# Patient Record
Sex: MALE | Race: BLACK OR AFRICAN AMERICAN | Employment: OTHER | RURAL
[De-identification: names, ages, dates, MRNs, and addresses within clinical notes are randomized per-mention and may not be internally consistent; named-entity substitution may affect disease eponyms.]

---

## 2017-01-13 ENCOUNTER — OFFICE VISIT (OUTPATIENT)
Dept: FAMILY MEDICINE CLINIC | Age: 57
End: 2017-01-13

## 2017-01-13 VITALS
HEIGHT: 72 IN | OXYGEN SATURATION: 100 % | WEIGHT: 267 LBS | BODY MASS INDEX: 36.16 KG/M2 | HEART RATE: 76 BPM | DIASTOLIC BLOOD PRESSURE: 78 MMHG | TEMPERATURE: 98.5 F | SYSTOLIC BLOOD PRESSURE: 118 MMHG | RESPIRATION RATE: 16 BRPM

## 2017-01-13 DIAGNOSIS — H61.23 BILATERAL IMPACTED CERUMEN: ICD-10-CM

## 2017-01-13 DIAGNOSIS — B07.0 PLANTAR WART OF RIGHT FOOT: ICD-10-CM

## 2017-01-13 DIAGNOSIS — M79.671 RIGHT FOOT PAIN: ICD-10-CM

## 2017-01-13 DIAGNOSIS — I10 ESSENTIAL HYPERTENSION: Primary | ICD-10-CM

## 2017-01-13 DIAGNOSIS — E78.1 PURE HYPERGLYCERIDEMIA: ICD-10-CM

## 2017-01-13 NOTE — MR AVS SNAPSHOT
Visit Information Date & Time Provider Department Dept. Phone Encounter #  
 1/13/2017 10:00 AM Chantal Clark NP [place] [address] [facility] [phone] [id number] Your Appointments 1/13/2017 10:00 AM  
ESTABLISHED PATIENT with Chantal Clark NP  
[address] ([address]) Appt Note: SOMETHING IN FOOT?  
 [address] [address]  
[address] [facility] [address] [address] Upcoming Health Maintenance Date Due Hepatitis C Screening 1960 Pneumococcal 19-64 Medium Risk (1 of 1 - PPSV23) 6/14/1977 DTaP/Tdap/Td series (1 - Tdap) 6/14/1979 FOBT Q 1 YEAR AGE 50-75 7/14/2017 Allergies as of 1/13/2017  Review Complete On: 1/13/2017 By: Jose Jaime No Known Allergies Current Immunizations  Never Reviewed Name Date Influenza Vaccine 12/1/2016, 10/6/2014 Not reviewed this visit You Were Diagnosed With   
  
 Codes Comments Essential hypertension    -  Primary ICD-10-CM: I10 
ICD-9-CM: 401.9 Bilateral impacted cerumen     ICD-10-CM: H61.23 
ICD-9-CM: 380.4 Vitals BP Pulse Temp Resp Height(growth percentile) Weight(growth percentile) 118/78 (BP 1 Location: Right arm, BP Patient Position: Sitting) 76 98.5 °F (36.9 °C) (Oral) 16 6' (1.829 m) 267 lb (121.1 kg) SpO2 BMI Smoking Status 100% 36.21 kg/m2 Light Tobacco Smoker Vitals History BMI and BSA Data Body Mass Index Body Surface Area  
 36.21 kg/m 2 2.48 m 2 Preferred Pharmacy Pharmacy Name Phone  [facility] [phone] Your Updated Medication List  
  
   
This list is accurate as of: 1/13/17  9:42 AM.  Always use your most recent med list.  
  
  
  
  
 amitriptyline 50 mg tablet Commonly known as:  ELAVIL Take 1 Tab by mouth nightly. atorvastatin 40 mg tablet Commonly known as:  LIPITOR  
TAKE ONE TABLET BY MOUTH EVERY DAY  
  
 diclofenac EC 75 mg EC tablet Commonly known as:  VOLTAREN  
ONE A DAY  
  
 dilTIAZem  mg ER capsule Commonly known as:  CARDIZEM CD Take 1 Cap by mouth daily. Indications: HYPERTENSION  
  
 enalapril 20 mg tablet Commonly known as:  VASOTEC  
TAKE ONE TABLET BY MOUTH EVERY DAY  
  
 gabapentin 300 mg capsule Commonly known as:  NEURONTIN Take 1 Cap by mouth two (2) times a day. Indications: NEUROPATHIC PAIN  
  
 hydroCHLOROthiazide 25 mg tablet Commonly known as:  HYDRODIURIL  
TAKE ONE TABLET BY MOUTH EVERY DAY  
  
 hydrocortisone 2.5 % rectal cream  
Commonly known as:  ANUSOL-HC Insert  into rectum three (3) times daily. We Performed the Following CBC W/O DIFF [56681 CPT(R)] COLLECTION VENOUS BLOOD,VENIPUNCTURE [id number] CPT(R)] METABOLIC PANEL, BASIC [23026 CPT(R)] PSA DIAGNOSTIC (PROSTATIC SPECIFIC AG) [id number] CPT(R)] REMOVAL IMPACTED CERUMEN IRRIGATION/LVG UNILAT [id number] CPT(R)] Comments: Both ears Introducing [facility]! Mignon Franklin introduces [employer] ([employer]) patient portal. Now you can access parts of your medical record, email your doctor's office, and request medication refills online. 1. In your internet browser, go to [email or web address] 2. Click on the First Time User? Click Here link in the Sign In box. You will see the New Member Sign Up page. 3. Enter your [employer]) Access Code exactly as it appears below. You will not need to use this code after youve completed the sign-up process. If you do not sign up before the expiration date, you must request a new code. · [employer]) Access Code: [email or web address] Expires: 4/13/2017  9:42 AM 
 
4. Enter the last four digits of your Social Security Number (xxxx) and Date of Birth (mm/dd/yyyy) as indicated and click Submit. You will be taken to the next sign-up page. 5. Create a TheInfo[employer] ID. This will be your [employer] login ID and cannot be changed, so think of one that is secure and easy to remember. 6. Create a [employer] password. You can change your password at any time. 7. Enter your Password Reset Question and Answer. This can be used at a later time if you forget your password. 8. Enter your e-mail address. You will receive e-mail notification when new information is available in [address] 9. Click Sign Up. You can now view and download portions of your medical record. 10. Click the Download Summary menu link to download a portable copy of your medical information. If you have questions, please visit the Frequently Asked Questions section of the [employer] website. Remember, [employer] is NOT to be used for urgent needs. For medical emergencies, dial 911. Now available from your iPhone and Android! Please provide this summary of care documentation to your next provider. Your primary care clinician is listed as Gillian Maradiaga. If you have any questions after today's visit, please call [phone].

## 2017-01-14 LAB
BUN SERPL-MCNC: 8 MG/DL (ref 6–24)
BUN/CREAT SERPL: 7 (ref 9–20)
CALCIUM SERPL-MCNC: 9.5 MG/DL (ref 8.7–10.2)
CHLORIDE SERPL-SCNC: 98 MMOL/L (ref 96–106)
CO2 SERPL-SCNC: 24 MMOL/L (ref 18–29)
CREAT SERPL-MCNC: 1.16 MG/DL (ref 0.76–1.27)
ERYTHROCYTE [DISTWIDTH] IN BLOOD BY AUTOMATED COUNT: 13.4 % (ref 12.3–15.4)
GLUCOSE SERPL-MCNC: 97 MG/DL (ref 65–99)
HCT VFR BLD AUTO: 40.9 % (ref 37.5–51)
HGB BLD-MCNC: 12.9 G/DL (ref 12.6–17.7)
MCH RBC QN AUTO: 24.9 PG (ref 26.6–33)
MCHC RBC AUTO-ENTMCNC: 31.5 G/DL (ref 31.5–35.7)
MCV RBC AUTO: 79 FL (ref 79–97)
PLATELET # BLD AUTO: 318 X10E3/UL (ref 150–379)
POTASSIUM SERPL-SCNC: 4.4 MMOL/L (ref 3.5–5.2)
PSA SERPL-MCNC: 2.5 NG/ML (ref 0–4)
RBC # BLD AUTO: 5.19 X10E6/UL (ref 4.14–5.8)
SODIUM SERPL-SCNC: 138 MMOL/L (ref 134–144)
WBC # BLD AUTO: 7.1 X10E3/UL (ref 3.4–10.8)

## 2017-01-19 ENCOUNTER — TELEPHONE (OUTPATIENT)
Dept: FAMILY MEDICINE CLINIC | Age: 57
End: 2017-01-19

## 2017-01-19 DIAGNOSIS — I10 ESSENTIAL HYPERTENSION, MALIGNANT: Primary | ICD-10-CM

## 2017-01-19 DIAGNOSIS — Z01.00 EYE EXAM, ROUTINE: ICD-10-CM

## 2017-01-19 NOTE — TELEPHONE ENCOUNTER
Patient's wife called back seeing eye doctor in [address] does not know name but has phone # [phone]. Called office spoke with  patient will be seeing Dr Letha Platt 02/28/27 for routine eye exam and just needs referral faxed to [phone].

## 2017-01-19 NOTE — TELEPHONE ENCOUNTER
Tried to call patient / wife. There is no referral in chart for eye appointment need to know who he is seeing and when. Tried to call number given in phone message says its not working and other number are disconnected.

## 2017-03-01 ENCOUNTER — TELEPHONE (OUTPATIENT)
Dept: FAMILY MEDICINE CLINIC | Age: 57
End: 2017-03-01

## 2017-03-01 DIAGNOSIS — Z01.00 EYE EXAM, ROUTINE: Primary | ICD-10-CM

## 2017-03-01 NOTE — TELEPHONE ENCOUNTER
Patient is asking for a optometry referral to a Dr. Huy Acosta for a twitching eye. I see there is already a referral to optometry with a Dr. Adelfo Cueva. Are they in the same group or is this a different request?  Dr. Lyly Davis number is [phone]. Patient is scheduled to see him on March 23 and would like a copy of the referral sent to him to take to the visit.

## 2017-03-03 ENCOUNTER — TELEPHONE (OUTPATIENT)
Dept: FAMILY MEDICINE CLINIC | Age: 57
End: 2017-03-03

## 2017-03-06 ENCOUNTER — TELEPHONE (OUTPATIENT)
Dept: FAMILY MEDICINE CLINIC | Age: 57
End: 2017-03-06

## 2017-03-06 NOTE — TELEPHONE ENCOUNTER
Please call about a referral . Wife could not tell me what dr it is was suppose to be for or where it was at she states we should know.

## 2017-03-13 ENCOUNTER — TELEPHONE (OUTPATIENT)
Dept: FAMILY MEDICINE CLINIC | Age: 57
End: 2017-03-13

## 2017-03-30 ENCOUNTER — TELEPHONE (OUTPATIENT)
Dept: FAMILY MEDICINE CLINIC | Age: 57
End: 2017-03-30

## 2017-06-09 ENCOUNTER — TELEPHONE (OUTPATIENT)
Dept: FAMILY MEDICINE CLINIC | Age: 57
End: 2017-06-09

## 2017-06-09 NOTE — TELEPHONE ENCOUNTER
Spoke with wife. Saw specialist in [place] and is scheduled for an MRI up there but can not make that appointment and would like it to be schedule at [address]. I have advised patient's wife she will need to call office who ordered test to have them schedule appointment to ensure the correct test is ordered with proper dx. I will mail patient a list of what neurosurgeons that we have.

## 2017-06-09 NOTE — TELEPHONE ENCOUNTER
Patient is requesting an order for a brain scan to be done at [facility] for twitching of the eye.

## 2017-06-12 ENCOUNTER — TELEPHONE (OUTPATIENT)
Dept: FAMILY MEDICINE CLINIC | Age: 57
End: 2017-06-12

## 2017-06-12 ENCOUNTER — DOCUMENTATION ONLY (OUTPATIENT)
Dept: FAMILY MEDICINE CLINIC | Age: 57
End: 2017-06-12

## 2017-06-12 NOTE — TELEPHONE ENCOUNTER
Patient says the specialist he's seeing in [facility] is on the [address] and too far to go. He would like to be referred to [address] [place] [address].

## 2017-06-12 NOTE — TELEPHONE ENCOUNTER
Spoke with [facility] neurosurgeon dept. All patient information has been given to set up appointment. They will call patient / wife directly to set up appointment. Need to fax over records to [phone] as soon as we get them from Dr Sha Bhakta.

## 2017-06-12 NOTE — TELEPHONE ENCOUNTER
Spoke with patient and wife. Saw Dr Jayme Taylor neurosurgeon in [place] for twitching in right cheek and eye. Unable to make it back to that office wants to go to [facility].

## 2017-06-12 NOTE — TELEPHONE ENCOUNTER
Called to speak with patient. Left a message @ [phone] (home)  also left a message on cell number.

## 2017-06-23 ENCOUNTER — TELEPHONE (OUTPATIENT)
Dept: FAMILY MEDICINE CLINIC | Age: 57
End: 2017-06-23

## 2017-07-07 ENCOUNTER — OFFICE VISIT (OUTPATIENT)
Dept: FAMILY MEDICINE CLINIC | Age: 57
End: 2017-07-07

## 2017-07-07 VITALS
DIASTOLIC BLOOD PRESSURE: 80 MMHG | SYSTOLIC BLOOD PRESSURE: 130 MMHG | WEIGHT: 261.4 LBS | TEMPERATURE: 98.3 F | RESPIRATION RATE: 16 BRPM | OXYGEN SATURATION: 98 % | BODY MASS INDEX: 38.72 KG/M2 | HEART RATE: 99 BPM | HEIGHT: 69 IN

## 2017-07-07 DIAGNOSIS — M10.9 ACUTE GOUT OF LEFT ELBOW, UNSPECIFIED CAUSE: Primary | ICD-10-CM

## 2017-07-07 RX ORDER — GABAPENTIN 300 MG/1
300 CAPSULE ORAL 2 TIMES DAILY
COMMUNITY
End: 2017-07-10 | Stop reason: SDUPTHER

## 2017-07-07 RX ORDER — COLCHICINE 0.6 MG/1
TABLET ORAL
Qty: 12 TAB | Refills: 0 | Status: SHIPPED | OUTPATIENT
Start: 2017-07-07 | End: 2017-08-01 | Stop reason: SDUPTHER

## 2017-07-07 RX ORDER — PREDNISONE 20 MG/1
TABLET ORAL
Qty: 11 TAB | Refills: 0 | Status: SHIPPED | OUTPATIENT
Start: 2017-07-07 | End: 2017-08-01 | Stop reason: SDUPTHER

## 2017-07-07 NOTE — PROGRESS NOTES
[address] [place] [address] [facility]   [phone]     7/7/2017  Chief Complaint   Patient presents with    Elbow Swelling     amd pain ,Left       HPI  Mr. Lazarus Bollard is a 59 y.o. male presents today with acute onset of left elbow swelling. Has a h/o gout typically in his big toe. Has not had an acute attack in a long time. Reports he did eat pork chops 2 days ago and then the swelling began. Review of Systems   Constitutional: Negative for chills and fever. Cardiovascular: Negative. Gastrointestinal: Negative. Musculoskeletal: Positive for joint pain (left elbow ). Past Medical History:   Diagnosis Date    Arthritis     Diverticulosis     Gout     Hypercholesterolemia     Hypertension     Sciatica        No Known Allergies    Current Outpatient Prescriptions on File Prior to Visit   Medication Sig Dispense Refill    atorvastatin (LIPITOR) 40 mg tablet TAKE ONE TABLET BY MOUTH EVERY DAY 90 Tab 3    enalapril (VASOTEC) 20 mg tablet TAKE ONE TABLET BY MOUTH EVERY DAY 90 Tab 3    amitriptyline (ELAVIL) 50 mg tablet Take 1 Tab by mouth nightly. 90 Tab 0    hydrochlorothiazide (HYDRODIURIL) 25 mg tablet TAKE ONE TABLET BY MOUTH EVERY DAY 90 Tab 3    diltiazem CD (CARDIZEM CD) 300 mg ER capsule Take 1 Cap by mouth daily. Indications: HYPERTENSION 90 Cap 3     No current facility-administered medications on file prior to visit. Visit Vitals    /80    Pulse 99    Temp 98.3 °F (36.8 °C) (Oral)    Resp 16    Ht 5' 9\" (1.753 m)    Wt 261 lb 6.4 oz (118.6 kg)    SpO2 98%    BMI 38.6 kg/m2     Physical Exam   Musculoskeletal:        Left elbow: He exhibits decreased range of motion and swelling (no erythema). Tenderness (diffuse ) found. Vitals reviewed. 1. Acute gout of left elbow, unspecified cause  Rest; to use ICE as needed for swelling  Discussed use of medication and pending lab work.      - CBC WITH AUTOMATED DIFF  - METABOLIC PANEL, COMPREHENSIVE  - URIC ACID    - colchicine (COLCRYS) 0.6 mg tablet; Take 2 tablets intially and then 1 tablet 1 hour later. Max # 3 tablets  Indications: acute gouty arthritis  Dispense: 12 Tab; Refill: 0  - predniSONE (DELTASONE) 20 mg tablet; 3 tablets today then 2 tablets x 3 days then 1 tablet x 2 days  Dispense: 11 Tab; Refill: 0        Follow-up Disposition:  Return if symptoms worsen or fail to improve.       John Manjarrez PA-C, P

## 2017-07-08 LAB
ALBUMIN SERPL-MCNC: 4.4 G/DL (ref 3.5–5.5)
ALBUMIN/GLOB SERPL: 1.3 {RATIO} (ref 1.2–2.2)
ALP SERPL-CCNC: 76 IU/L (ref 39–117)
ALT SERPL-CCNC: 18 IU/L (ref 0–44)
AST SERPL-CCNC: 20 IU/L (ref 0–40)
BASOPHILS # BLD AUTO: 0 X10E3/UL (ref 0–0.2)
BASOPHILS NFR BLD AUTO: 0 %
BILIRUB SERPL-MCNC: 1.1 MG/DL (ref 0–1.2)
BUN SERPL-MCNC: 11 MG/DL (ref 6–24)
BUN/CREAT SERPL: 9 (ref 9–20)
CALCIUM SERPL-MCNC: 9.8 MG/DL (ref 8.7–10.2)
CHLORIDE SERPL-SCNC: 96 MMOL/L (ref 96–106)
CO2 SERPL-SCNC: 22 MMOL/L (ref 18–29)
CREAT SERPL-MCNC: 1.29 MG/DL (ref 0.76–1.27)
EOSINOPHIL # BLD AUTO: 0 X10E3/UL (ref 0–0.4)
EOSINOPHIL NFR BLD AUTO: 0 %
ERYTHROCYTE [DISTWIDTH] IN BLOOD BY AUTOMATED COUNT: 13.8 % (ref 12.3–15.4)
GLOBULIN SER CALC-MCNC: 3.4 G/DL (ref 1.5–4.5)
GLUCOSE SERPL-MCNC: 92 MG/DL (ref 65–99)
HCT VFR BLD AUTO: 39.9 % (ref 37.5–51)
HGB BLD-MCNC: 12.3 G/DL (ref 12.6–17.7)
IMM GRANULOCYTES # BLD: 0 X10E3/UL (ref 0–0.1)
IMM GRANULOCYTES NFR BLD: 0 %
LYMPHOCYTES # BLD AUTO: 2.4 X10E3/UL (ref 0.7–3.1)
LYMPHOCYTES NFR BLD AUTO: 20 %
MCH RBC QN AUTO: 24.7 PG (ref 26.6–33)
MCHC RBC AUTO-ENTMCNC: 30.8 G/DL (ref 31.5–35.7)
MCV RBC AUTO: 80 FL (ref 79–97)
MONOCYTES # BLD AUTO: 1.1 X10E3/UL (ref 0.1–0.9)
MONOCYTES NFR BLD AUTO: 10 %
NEUTROPHILS # BLD AUTO: 8.4 X10E3/UL (ref 1.4–7)
NEUTROPHILS NFR BLD AUTO: 70 %
PLATELET # BLD AUTO: 317 X10E3/UL (ref 150–379)
POTASSIUM SERPL-SCNC: 4.1 MMOL/L (ref 3.5–5.2)
PROT SERPL-MCNC: 7.8 G/DL (ref 6–8.5)
RBC # BLD AUTO: 4.97 X10E6/UL (ref 4.14–5.8)
SODIUM SERPL-SCNC: 137 MMOL/L (ref 134–144)
URATE SERPL-MCNC: 6.7 MG/DL (ref 3.7–8.6)
WBC # BLD AUTO: 11.9 X10E3/UL (ref 3.4–10.8)

## 2017-07-10 ENCOUNTER — TELEPHONE (OUTPATIENT)
Dept: FAMILY MEDICINE CLINIC | Age: 57
End: 2017-07-10

## 2017-07-10 RX ORDER — GABAPENTIN 300 MG/1
300 CAPSULE ORAL 2 TIMES DAILY
Qty: 60 CAP | Refills: 1 | Status: SHIPPED | OUTPATIENT
Start: 2017-07-10 | End: 2017-08-01 | Stop reason: SDUPTHER

## 2017-07-10 NOTE — TELEPHONE ENCOUNTER
Patient has leg spasms and is asking for medication to be sent to his pharmacy. Wife says he's had this problem in the past.  Francine Benson.

## 2017-07-10 NOTE — TELEPHONE ENCOUNTER
Spoke with patient. Here with wife who has appointment scheduled. Gout symptoms are much better. Requesting med for leg problems.

## 2017-07-14 NOTE — PROGRESS NOTES
Unable to contact patient x 2 attempts  Busy signal  WBC elevated   Uric acid level normal   F/u as needed for continued left elbow symptoms

## 2017-07-18 ENCOUNTER — TELEPHONE (OUTPATIENT)
Dept: FAMILY MEDICINE CLINIC | Age: 57
End: 2017-07-18

## 2017-07-18 DIAGNOSIS — G89.29 OTHER CHRONIC PAIN: Primary | ICD-10-CM

## 2017-07-21 ENCOUNTER — TELEPHONE (OUTPATIENT)
Dept: FAMILY MEDICINE CLINIC | Age: 57
End: 2017-07-21

## 2017-07-21 NOTE — TELEPHONE ENCOUNTER
Patient saw Kenny Zaragoza 7- for left elbow pain,called today to report pain is still a #8 on movement and requesting an x-ray at [facility]. states not urgent can wait until next week.

## 2017-07-21 NOTE — TELEPHONE ENCOUNTER
Mr Tonio Cardenas is still having problems with his elbow.   He wants an order for an elbow xray sent to [facility].  [phone]

## 2017-07-24 DIAGNOSIS — M25.521 RIGHT ELBOW PAIN: Primary | ICD-10-CM

## 2017-07-24 NOTE — TELEPHONE ENCOUNTER
Patient has been advised and orders have been faxed. I have asked patient to call us back for results if we have not called him.

## 2017-07-27 ENCOUNTER — TELEPHONE (OUTPATIENT)
Dept: FAMILY MEDICINE CLINIC | Age: 57
End: 2017-07-27

## 2017-07-27 NOTE — TELEPHONE ENCOUNTER
Spoke with wife Alexandra Hayes. Needs order for xray sent to [facility] now instead of [facility]. Order faxed to radiology and .

## 2017-08-01 DIAGNOSIS — M10.9 ACUTE GOUT OF LEFT ELBOW, UNSPECIFIED CAUSE: ICD-10-CM

## 2017-08-01 RX ORDER — GABAPENTIN 300 MG/1
300 CAPSULE ORAL 2 TIMES DAILY
Qty: 180 CAP | Refills: 1 | Status: SHIPPED | OUTPATIENT
Start: 2017-08-01 | End: 2018-03-19 | Stop reason: SDUPTHER

## 2017-08-01 RX ORDER — PREDNISONE 20 MG/1
TABLET ORAL
Qty: 11 TAB | Refills: 0 | Status: SHIPPED | OUTPATIENT
Start: 2017-08-01 | End: 2018-01-22 | Stop reason: ALTCHOICE

## 2017-08-01 RX ORDER — COLCHICINE 0.6 MG/1
TABLET ORAL
Qty: 12 TAB | Refills: 0 | Status: SHIPPED | OUTPATIENT
Start: 2017-08-01 | End: 2018-03-19 | Stop reason: SDUPTHER

## 2017-08-09 ENCOUNTER — TELEPHONE (OUTPATIENT)
Dept: FAMILY MEDICINE CLINIC | Age: 57
End: 2017-08-09

## 2017-08-09 DIAGNOSIS — M79.673 HEEL PAIN, UNSPECIFIED LATERALITY: ICD-10-CM

## 2017-08-09 DIAGNOSIS — M77.8 TENDINITIS OF ELBOW: Primary | ICD-10-CM

## 2017-08-09 NOTE — TELEPHONE ENCOUNTER
Patient would also like his referral to include a problem with Rt heel pain and a \"pins and needle\" feeling.

## 2017-08-15 ENCOUNTER — TELEPHONE (OUTPATIENT)
Dept: FAMILY MEDICINE CLINIC | Age: 57
End: 2017-08-15

## 2017-08-16 NOTE — TELEPHONE ENCOUNTER
Patient's wife Callie Quinteros has been advised of appointment with Dr Joaquin Dolan 09/18/17 @ 1045 am.

## 2017-09-15 RX ORDER — AMITRIPTYLINE HYDROCHLORIDE 50 MG/1
50 TABLET, FILM COATED ORAL
Qty: 90 TAB | Refills: 0 | Status: SHIPPED | OUTPATIENT
Start: 2017-09-15 | End: 2017-12-08 | Stop reason: SDUPTHER

## 2017-11-09 RX ORDER — DILTIAZEM HYDROCHLORIDE 300 MG/1
300 CAPSULE, COATED, EXTENDED RELEASE ORAL DAILY
Qty: 90 CAP | Refills: 1 | Status: SHIPPED | OUTPATIENT
Start: 2017-11-09 | End: 2018-02-16 | Stop reason: SDUPTHER

## 2017-12-20 RX ORDER — AMITRIPTYLINE HYDROCHLORIDE 50 MG/1
TABLET, FILM COATED ORAL
Qty: 90 TAB | Refills: 0 | Status: SHIPPED | OUTPATIENT
Start: 2017-12-20 | End: 2018-03-11 | Stop reason: SDUPTHER

## 2018-01-22 ENCOUNTER — OFFICE VISIT (OUTPATIENT)
Dept: FAMILY MEDICINE CLINIC | Age: 58
End: 2018-01-22

## 2018-01-22 VITALS
SYSTOLIC BLOOD PRESSURE: 132 MMHG | WEIGHT: 272.8 LBS | HEIGHT: 69 IN | HEART RATE: 70 BPM | DIASTOLIC BLOOD PRESSURE: 78 MMHG | TEMPERATURE: 97.8 F | OXYGEN SATURATION: 99 % | BODY MASS INDEX: 40.4 KG/M2 | RESPIRATION RATE: 18 BRPM

## 2018-01-22 DIAGNOSIS — I10 ESSENTIAL HYPERTENSION: Primary | ICD-10-CM

## 2018-01-22 DIAGNOSIS — M19.90 ARTHRITIS: ICD-10-CM

## 2018-01-22 DIAGNOSIS — Z23 ENCOUNTER FOR IMMUNIZATION: ICD-10-CM

## 2018-01-22 DIAGNOSIS — Z00.00 ENCOUNTER FOR MEDICARE ANNUAL WELLNESS EXAM: ICD-10-CM

## 2018-01-22 DIAGNOSIS — Z11.59 ENCOUNTER FOR HEPATITIS C SCREENING TEST FOR LOW RISK PATIENT: ICD-10-CM

## 2018-01-22 DIAGNOSIS — Z12.5 SCREENING FOR PROSTATE CANCER: ICD-10-CM

## 2018-01-22 DIAGNOSIS — H61.23 CERUMEN DEBRIS ON TYMPANIC MEMBRANE OF BOTH EARS: ICD-10-CM

## 2018-01-22 DIAGNOSIS — R35.1 NOCTURIA: ICD-10-CM

## 2018-01-22 DIAGNOSIS — Z12.11 COLON CANCER SCREENING: ICD-10-CM

## 2018-01-22 DIAGNOSIS — E66.01 OBESITY, MORBID (HCC): ICD-10-CM

## 2018-01-22 DIAGNOSIS — E78.1 PURE HYPERGLYCERIDEMIA: ICD-10-CM

## 2018-01-22 NOTE — PROGRESS NOTES
Subjective:     Ana Martin is a 59 y.o. male who presents today with the following:  Chief Complaint   Patient presents with    Annual Wellness Visit     subsequent   Erika Banks presents for chronic disease management for medical condition listed below. Ate breakfast this morning would like to come in on Friday for fasting labs. COMPLIANT WITH MEDICATION:   HTN; Denies chest pain, dyspnea, palpitations, headache and blurred vision. Blood pressure normotensive. BMI:Discussed the patient's BMI with him. The BMI follow up plan is as follows:     dietary management education, guidance, and counseling  encourage exercise  monitor weight  prescribed dietary intake    An After Visit Summary was printed and given to the patient. Arthritis: good relief from tylenol affecting primarily back ad knees (especially right knee). HM: Will check prostate today. Nocturia 2 to 3 times per night. Requesting prostate exam.  Check FIT test today. Pneumonia vaccine. ROS:  Gen: denies fever, chills, fatigue, weight loss, weight gain  HEENT:denies blurry vision, nasal congestion, sore throat  Resp: denies dypsnea, cough, wheezing  CV: denies chest pain radiating to the jaws or arms, palpitations, lower extremity edema  Abd: denies nausea, vomiting, diarrhea, constipation  Neuro: denies numbness/tingling  Endo: denies polyuria, polydipsia, heat/cold intolerance  Heme: no lymphadenopathy    No Known Allergies      Current Outpatient Prescriptions:     amitriptyline (ELAVIL) 50 mg tablet, TAKE 1 TABLET NIGHTLY., Disp: 90 Tab, Rfl: 0    dilTIAZem CD (CARDIZEM CD) 300 mg ER capsule, Take 1 Cap by mouth daily. Indications: hypertension, Disp: 90 Cap, Rfl: 1    colchicine (COLCRYS) 0.6 mg tablet, Take 2 tablets intially and then 1 tablet 1 hour later. Max # 3 tablets  Indications: acute gouty arthritis, Disp: 12 Tab, Rfl: 0    gabapentin (NEURONTIN) 300 mg capsule, Take 1 Cap by mouth two (2) times a day. , Disp: 180 Cap, Rfl: 1    atorvastatin (LIPITOR) 40 mg tablet, TAKE ONE TABLET BY MOUTH EVERY DAY, Disp: 90 Tab, Rfl: 3    enalapril (VASOTEC) 20 mg tablet, TAKE ONE TABLET BY MOUTH EVERY DAY, Disp: 90 Tab, Rfl: 3    hydrochlorothiazide (HYDRODIURIL) 25 mg tablet, TAKE ONE TABLET BY MOUTH EVERY DAY, Disp: 90 Tab, Rfl: 3    Past Medical History:   Diagnosis Date    Arthritis     Diverticulosis     Gout     Hypercholesterolemia     Hypertension     Sciatica        Past Surgical History:   Procedure Laterality Date    HX COLONOSCOPY  022011    HX COLONOSCOPY  8/2015    polyp x1       History   Smoking Status    Light Tobacco Smoker    Types: Cigarettes   Smokeless Tobacco    Never Used     Comment: occassionally       Social History     Social History    Marital status:      Spouse name: N/A    Number of children: N/A    Years of education: N/A     Social History Main Topics    Smoking status: Light Tobacco Smoker     Types: Cigarettes    Smokeless tobacco: Never Used      Comment: occassionally    Alcohol use Yes    Drug use: No    Sexual activity: Not Asked     Other Topics Concern     Service No    Blood Transfusions No    Caffeine Concern No    Occupational Exposure No    Hobby Hazards No    Sleep Concern No    Stress Concern No    Weight Concern Yes    Special Diet No    Back Care Yes     back Lower Lumbar problems    Exercise Yes    Bike Helmet No    Seat Belt Yes    Self-Exams Yes     Social History Narrative       Family History   Problem Relation Age of Onset    No Known Problems Mother          Objective:     Visit Vitals    /78 (BP 1 Location: Left arm, BP Patient Position: Sitting)    Pulse 70    Temp 97.8 °F (36.6 °C) (Temporal)    Resp 18    Ht 5' 9\" (1.753 m)    Wt 272 lb 12.8 oz (123.7 kg)    SpO2 99%    BMI 40.29 kg/m2     Body mass index is 40.29 kg/(m^2). General: Alert and oriented. No acute distress.   Well nourished, obesity  HEENT :  Ears: cerumen impaction post irrigation without instrumentation TMs are normal. Canals are clear. Eyes: pupils equal, round, react to light and accommodation. Extra ocular movements intact. Nose: patent. Mouth and throat is clear. Neck:supple full range of motion no thyromegaly. Trachea midline, No carotid bruits. No significant lymphadenopathy  Lungs[de-identified] clear to auscultation without wheezes, rales, or rhonchi. Heart :RRR, S1 & S2 are normal intensity. No murmur; no gallop  Abdomen: bowel sounds active. No tenderness, guarding, rebound, masses, hepatic or spleen enlargement  : no nodules mild enlargement. Back: no CVA tenderness. Extremities: without clubbing, cyanosis, or edema  Pulses: radial and femoral pulses are normal  Neuro: HMF intact. Cranial nerves II through XII grossly normal.  Motor: is 5 over 5 and symmetrical.   Deep tendon reflexes: +2 equal          No results found for this visit on 01/22/18. Assessment/ Plan:     Diagnoses and all orders for this visit:    1. Essential hypertension  -     CBC WITH AUTOMATED DIFF; Future  -     METABOLIC PANEL, COMPREHENSIVE; Future  -     LIPID PANEL; Future    2. BMI 40.0-44.9, adult (HCC)  -     CBC WITH AUTOMATED DIFF; Future    3. Encounter for immunization  -     PNEUMOCOCCAL CONJ VACCINE 13 VALENT IM  -     AR IMMUNIZ ADMIN,1 SINGLE/COMB VAC/TOXOID    4. Cerumen debris on tympanic membrane of both ears  -     REMOVAL IMPACTED CERUMEN IRRIGATION/LVG UNILAT    5. Obesity, morbid ([address]  -     CBC WITH AUTOMATED DIFF; Future  -     METABOLIC PANEL, COMPREHENSIVE; Future  -     LIPID PANEL; Future    6. Arthritis    7. Pure hyperglyceridemia    8. Screening for prostate cancer  -     PROSTATE SPECIFIC AG; Future    9. Encounter for hepatitis C screening test for low risk patient  -     HEPATITIS C AB; Future    10. Nocturia   -     PROSTATE SPECIFIC AG; Future    11. Colon cancer screening  -     OCCULT BLOOD, IMMUNOASSAY (FIT)         1. Essential hypertension    2. BMI 40.0-44.9, adult ([facility]    3. Encounter for immunization    4. Cerumen debris on tympanic membrane of both ears    5. Obesity, morbid ([address]    6. Arthritis    7. Pure hyperglyceridemia    8. Screening for prostate cancer    9. Encounter for hepatitis C screening test for low risk patient    10. Nocturia     11. Colon cancer screening        Orders Placed This Encounter    REMOVAL IMPACTED CERUMEN IRRIGATION/LVG UNILAT    PNEUMOCOCCAL CONJ VACCINE 13 VALENT IM    CBC WITH AUTOMATED DIFF     Standing Status:   Future     Standing Expiration Date:   8/86/1502    METABOLIC PANEL, COMPREHENSIVE     Standing Status:   Future     Standing Expiration Date:   7/22/2018    LIPID PANEL     Standing Status:   Future     Standing Expiration Date:   7/22/2018    PROSTATE SPECIFIC AG     Standing Status:   Future     Standing Expiration Date:   7/22/2018    HEPATITIS C AB     Standing Status:   Future     Standing Expiration Date:   7/22/2018    OCCULT BLOOD, IMMUNOASSAY (FIT)    FL IMMUNIZ ADMIN,1 SINGLE/COMB VAC/TOXOID         Verbal and written instructions (see AVS) provided.  Patient expresses understanding of diagnosis and treatment plan. Follow-up Disposition:  Return in about 3 months (around 4/22/2018). Abraham Echeverria, WILLY-HEIDE        Stephanie Garcia is a 59 y.o. male and presents for annual Medicare Wellness Visit. Problem List: Reviewed with patient and discussed risk factors.     Patient Active Problem List   Diagnosis Code    Hypertension I10    Arthritis M19.90    Esophagitis determined by endoscopy K20.9    Enteritis due to Escherichia coli A04.4    Hyperlipemia E78.5    Diverticulosis large intestine w/o perforation or abscess w/o bleeding K57.30    Obesity, morbid (HCC) E66.01       Current medical providers:  Patient Care Team:  Chelsea Avila NP as PCP - General (Nurse Practitioner)  Jessika Miller MD (General Surgery)    [address] [place] [address] Po: Reviewed with patient  Past Surgical History:   Procedure Laterality Date   Ree Ran COLONOSCOPY  [id number]   Ree Ran COLONOSCOPY  8/2015    polyp x1        SH: Reviewed with patient  Social History   Substance Use Topics    Smoking status: Light Tobacco Smoker     Types: Cigarettes    Smokeless tobacco: Never Used      Comment: occassionally    Alcohol use Yes       FH: Reviewed with patient  Family History   Problem Relation Age of Onset    No Known Problems Mother        Medications/Allergies: Reviewed with patient  Current Outpatient Prescriptions on File Prior to Visit   Medication Sig Dispense Refill    amitriptyline (ELAVIL) 50 mg tablet TAKE 1 TABLET NIGHTLY. 90 Tab 0    dilTIAZem CD (CARDIZEM CD) 300 mg ER capsule Take 1 Cap by mouth daily. Indications: hypertension 90 Cap 1    colchicine (COLCRYS) 0.6 mg tablet Take 2 tablets intially and then 1 tablet 1 hour later. Max # 3 tablets  Indications: acute gouty arthritis 12 Tab 0    gabapentin (NEURONTIN) 300 mg capsule Take 1 Cap by mouth two (2) times a day. 180 Cap 1    atorvastatin (LIPITOR) 40 mg tablet TAKE ONE TABLET BY MOUTH EVERY DAY 90 Tab 3    enalapril (VASOTEC) 20 mg tablet TAKE ONE TABLET BY MOUTH EVERY DAY 90 Tab 3    hydrochlorothiazide (HYDRODIURIL) 25 mg tablet TAKE ONE TABLET BY MOUTH EVERY DAY 90 Tab 3     No current facility-administered medications on file prior to visit. No Known Allergies    Objective:  Visit Vitals    /78 (BP 1 Location: Left arm, BP Patient Position: Sitting)    Pulse 70    Temp 97.8 °F (36.6 °C) (Temporal)    Resp 18    Ht 5' 9\" (1.753 m)    Wt 272 lb 12.8 oz (123.7 kg)    SpO2 99%    BMI 40.29 kg/m2    Body mass index is 40.29 kg/(m^2).     Assessment of cognitive impairment: Alert and oriented x 3    Depression Screen:   PHQ over the last two weeks 1/22/2018   Little interest or pleasure in doing things Not at all   Feeling down, depressed or hopeless Not at all   Total Score PHQ 2 0       Fall Risk Assessment:  No flowsheet data found. Functional Ability:   Does the patient exhibit a steady gait? yes   How long did it take the patient to get up and walk from a sitting position? 3 seconds   Is the patient self reliant?  (ie can do own laundry, meals, household chores)  yes     Does the patient handle his/her own medications? yes     Does the patient handle his/her own money? yes     Is the patients home safe (ie good lighting, handrails on stairs and bath, etc.)? yes     Did you notice or did patient express any hearing difficulties? no     Did you notice or did patient express any vision difficulties?   no     Were distance and reading eye charts used? no       Advance Care Planning:   Patient was offered the opportunity to discuss advance care planning:  yes     Does patient have an Advance Directive:  no   If no, did you provide information on Caring Connections? yes       Plan:      Orders Placed This Encounter    REMOVAL IMPACTED CERUMEN IRRIGATION/LVG UNILAT    PNEUMOCOCCAL CONJ VACCINE 13 VALENT IM    CBC WITH AUTOMATED DIFF    METABOLIC PANEL, COMPREHENSIVE    LIPID PANEL    PROSTATE SPECIFIC AG    HEPATITIS C AB    OCCULT BLOOD, IMMUNOASSAY (FIT)    MD IMMUNIZ ADMIN,1 SINGLE/COMB VAC/TOXOID       Health Maintenance   Topic Date Due    Hepatitis C Screening  1960    FOBT Q 1 YEAR AGE 50-75  07/14/2017    MEDICARE YEARLY EXAM  01/23/2019    DTaP/Tdap/Td series (2 - Td) 01/22/2028    Pneumococcal 19-64 Medium Risk  Completed    Influenza Age 5 to Adult  Completed       *Patient verbalized understanding and agreement with the plan. A copy of the After Visit Summary with personalized health plan was given to the patient today. Follow-up Disposition:  Return in about 3 months (around 4/22/2018).         CARLTON Perez

## 2018-01-22 NOTE — ACP (ADVANCE CARE PLANNING)
Advance care planning discussed with patient form given and instructed to bring back to file in chart.

## 2018-01-22 NOTE — MR AVS SNAPSHOT
[address] [place] [address] [address] 
[phone] Patient: Gaby Good 
MRN: [id number] DPH:7/77/4616 Visit Information Date & Time Provider Department Dept. Phone Encounter #  
 1/22/2018  8:00 AM Eneida Fajardo NP [facility] 1340 [facility] [phone] [id number] Upcoming Health Maintenance Date Due Hepatitis C Screening 1960 FOBT Q 1 YEAR AGE 50-75 7/14/2017 MEDICARE YEARLY EXAM 1/23/2019 DTaP/Tdap/Td series (2 - Td) 1/22/2028 Allergies as of 1/22/2018  Review Complete On: 1/22/2018 By: Eneida Fajardo NP No Known Allergies Current Immunizations  Never Reviewed Name Date Influenza Vaccine 10/12/2017, 12/1/2016, 10/6/2014 Pneumococcal Conjugate (PCV-13) 1/22/2018 Not reviewed this visit You Were Diagnosed With   
  
 Codes Comments Essential hypertension    -  Primary ICD-10-CM: I10 
ICD-9-CM: 401.9 BMI 40.0-44.9, adult [facility])     ICD-10-CM: Z68.41 
ICD-9-CM: V85.41 Encounter for immunization     ICD-10-CM: A28 ICD-9-CM: V03.89 Cerumen debris on tympanic membrane of both ears     ICD-10-CM: H61.23 
ICD-9-CM: 380.4 Obesity, morbid ([address]     ICD-10-CM: E66.01 
ICD-9-CM: 278.01 Arthritis     ICD-10-CM: M19.90 ICD-9-CM: 716.90 Pure hyperglyceridemia     ICD-10-CM: E78.1 ICD-9-CM: 272.1 Screening for prostate cancer     ICD-10-CM: Z12.5 ICD-9-CM: V76.44 Encounter for hepatitis C screening test for low risk patient     ICD-10-CM: Z11.59 
ICD-9-CM: V73.89 Nocturia     ICD-10-CM: R35.1 ICD-9-CM: 788.43 Colon cancer screening     ICD-10-CM: Z12.11 ICD-9-CM: V76.51 Vitals BP Pulse Temp Resp Height(growth percentile) 132/78 (BP 1 Location: Left arm, BP Patient Position: Sitting) 70 97.8 °F (36.6 °C) (Temporal) 18 5' 9\" (1.753 m) Weight(growth percentile) SpO2 BMI Smoking Status 272 lb 12.8 oz (123.7 kg) 99% 40.29 kg/m2 Light Tobacco Smoker BMI and BSA Data Body Mass Index Body Surface Area  
 40.29 kg/m 2 2.45 m 2 Preferred Pharmacy Pharmacy Name Phone  [address] [phone] Your Updated Medication List  
  
   
This list is accurate as of: 1/22/18  9:57 AM.  Always use your most recent med list.  
  
  
  
  
 amitriptyline 50 mg tablet Commonly known as:  ELAVIL TAKE 1 TABLET NIGHTLY. atorvastatin 40 mg tablet Commonly known as:  LIPITOR  
TAKE ONE TABLET BY MOUTH EVERY DAY  
  
 colchicine 0.6 mg tablet Commonly known as:  COLCRYS Take 2 tablets intially and then 1 tablet 1 hour later. Max # 3 tablets  Indications: acute gouty arthritis  
  
 dilTIAZem  mg ER capsule Commonly known as:  CARDIZEM CD Take 1 Cap by mouth daily. Indications: hypertension  
  
 enalapril 20 mg tablet Commonly known as:  VASOTEC  
TAKE ONE TABLET BY MOUTH EVERY DAY  
  
 gabapentin 300 mg capsule Commonly known as:  NEURONTIN Take 1 Cap by mouth two (2) times a day. hydroCHLOROthiazide 25 mg tablet Commonly known as:  HYDRODIURIL  
TAKE ONE TABLET BY MOUTH EVERY DAY We Performed the Following OCCULT BLOOD, IMMUNOASSAY (FIT) [id number] CPT(R)] PNEUMOCOCCAL CONJ VACCINE 13 VALENT IM [id number] CPT(R)] NE IMMUNIZ ADMIN,1 SINGLE/COMB VAC/TOXOID Q0239294 CPT(R)] REMOVAL IMPACTED CERUMEN IRRIGATION/LVG UNILAT [id number] CPT(R)] To-Do List   
 01/22/2018 Lab:  CBC WITH AUTOMATED DIFF   
  
 01/22/2018 Lab:  HEPATITIS C AB   
  
 01/22/2018 Lab:  LIPID PANEL   
  
 01/22/2018 Lab:  METABOLIC PANEL, COMPREHENSIVE   
  
 01/22/2018 Lab:  PSA, DIAGNOSTIC (PROSTATE SPECIFIC AG) Introducing [facility] & HEALTH SERVICES!    
 [employer] introduces [employer] patient portal. Now you can access parts of your medical record, email your doctor's office, and request medication refills online. 1. In your internet browser, go to [email or web address] 2. Click on the First Time User? Click Here link in the Sign In box. You will see the New Member Sign Up page. 3. Enter your MenuSpring Access Code exactly as it appears below. You will not need to use this code after youve completed the sign-up process. If you do not sign up before the expiration date, you must request a new code. · MenuSpring Access Code: [email or web address] Expires: 2/19/2018  7:20 AM 
 
4. Enter the last four digits of your Social Security Number (xxxx) and Date of Birth (mm/dd/yyyy) as indicated and click Submit. You will be taken to the next sign-up page. 5. Create a MenuSpring ID. This will be your MenuSpring login ID and cannot be changed, so think of one that is secure and easy to remember. 6. Create a MenuSpring password. You can change your password at any time. 7. Enter your Password Reset Question and Answer. This can be used at a later time if you forget your password. 8. Enter your e-mail address. You will receive e-mail notification when new information is available in [address] 9. Click Sign Up. You can now view and download portions of your medical record. 10. Click the Download Summary menu link to download a portable copy of your medical information. If you have questions, please visit the Frequently Asked Questions section of the MenuSpring website. Remember, MenuSpring is NOT to be used for urgent needs. For medical emergencies, dial 911. Now available from your iPhone and Android! Please provide this summary of care documentation to your next provider. Your primary care clinician is listed as Ciarra Gamino. If you have any questions after today's visit, please call [phone].

## 2018-01-24 LAB — HEMOCCULT STL QL IA: NEGATIVE

## 2018-01-31 ENCOUNTER — CLINICAL SUPPORT (OUTPATIENT)
Dept: FAMILY MEDICINE CLINIC | Age: 58
End: 2018-01-31

## 2018-01-31 DIAGNOSIS — R35.1 NOCTURIA: ICD-10-CM

## 2018-01-31 DIAGNOSIS — E66.01 OBESITY, MORBID (HCC): ICD-10-CM

## 2018-01-31 DIAGNOSIS — Z11.59 ENCOUNTER FOR HEPATITIS C SCREENING TEST FOR LOW RISK PATIENT: ICD-10-CM

## 2018-01-31 DIAGNOSIS — I10 ESSENTIAL HYPERTENSION: ICD-10-CM

## 2018-01-31 DIAGNOSIS — Z12.5 SCREENING FOR PROSTATE CANCER: ICD-10-CM

## 2018-01-31 NOTE — MR AVS SNAPSHOT
[address] [facility] 
 
 
 [address] 
[phone] Patient: Lorraine Arevalo 
MRN: [id number] IFO:[birth year] Visit Information Date & Time Provider Department Dept. Phone Encounter #  
 1/31/2018  3:00 PM [address] [phone] [id number] Your Appointments 1/31/2018  3:00 PM  
Nurse Visit with [address] ([facility]) Appt Note: Labs; Labs for SALMA Carr; Labs for SALMA Nayely Carr [address] [facility] [address]  
[address] [facility] 9449 [facility] [address] Upcoming Health Maintenance Date Due Hepatitis C Screening 1960 FOBT Q 1 YEAR AGE 50-75 1/22/2019 MEDICARE YEARLY EXAM 1/23/2019 DTaP/Tdap/Td series (2 - Td) 1/22/2028 Allergies as of 1/31/2018  Review Complete On: 1/22/2018 By: Marely Cabrera NP No Known Allergies Current Immunizations  Never Reviewed Name Date Influenza Vaccine 10/12/2017, 12/1/2016, 10/6/2014 Pneumococcal Conjugate (PCV-13) 1/22/2018 Not reviewed this visit You Were Diagnosed With   
  
 Codes Comments BMI 40.0-44.9, adult [facility])     ICD-10-CM: Z68.41 
ICD-9-CM: V85.41 Essential hypertension     ICD-10-CM: I10 
ICD-9-CM: 401.9 Obesity, morbid ([facility] [address]     ICD-10-CM: E66.01 
ICD-9-CM: 278.01 Nocturia     ICD-10-CM: R35.1 ICD-9-CM: 788.43 Screening for prostate cancer     ICD-10-CM: Z12.5 ICD-9-CM: V76.44 Encounter for hepatitis C screening test for low risk patient     ICD-10-CM: Z11.59 
ICD-9-CM: V73.89 Vitals Smoking Status Light Tobacco Smoker Preferred Pharmacy Pharmacy Name Phone  [address] [phone] Your Updated Medication List  
  
   
 This list is accurate as of: 1/31/18  9:37 AM.  Always use your most recent med list.  
  
  
  
  
 amitriptyline 50 mg tablet Commonly known as:  ELAVIL TAKE 1 TABLET NIGHTLY. atorvastatin 40 mg tablet Commonly known as:  LIPITOR  
TAKE ONE TABLET BY MOUTH EVERY DAY  
  
 colchicine 0.6 mg tablet Commonly known as:  COLCRYS Take 2 tablets intially and then 1 tablet 1 hour later. Max # 3 tablets  Indications: acute gouty arthritis  
  
 dilTIAZem  mg ER capsule Commonly known as:  CARDIZEM CD Take 1 Cap by mouth daily. Indications: hypertension  
  
 enalapril 20 mg tablet Commonly known as:  VASOTEC  
TAKE ONE TABLET BY MOUTH EVERY DAY  
  
 gabapentin 300 mg capsule Commonly known as:  NEURONTIN Take 1 Cap by mouth two (2) times a day. hydroCHLOROthiazide 25 mg tablet Commonly known as:  HYDRODIURIL  
TAKE ONE TABLET BY MOUTH EVERY DAY We Performed the Following CBC WITH AUTOMATED DIFF [14196 CPT(R)] COLLECTION VENOUS BLOOD,VENIPUNCTURE [id number] CPT(R)] HEPATITIS C AB [24241 CPT(R)] LIPID PANEL [45833 CPT(R)] METABOLIC PANEL, COMPREHENSIVE [75463 CPT(R)] PSA, DIAGNOSTIC (PROSTATE SPECIFIC AG) [id number] CPT(R)] Introducing [facility] & HEALTH SERVICES! Maynor Brito introduces [employer] patient portal. Now you can access parts of your medical record, email your doctor's office, and request medication refills online. 1. In your internet browser, go to [email or web address] 2. Click on the First Time User? Click Here link in the Sign In box. You will see the New Member Sign Up page. 3. Enter your CloudAcademy Access Code exactly as it appears below. You will not need to use this code after youve completed the sign-up process. If you do not sign up before the expiration date, you must request a new code. · CloudAcademy Access Code: [email or web address] Expires: 2/19/2018  7:20 AM 
 
 4. Enter the last four digits of your Social Security Number (xxxx) and Date of Birth (mm/dd/yyyy) as indicated and click Submit. You will be taken to the next sign-up page. 5. Create a [employer] ID. This will be your [employer] login ID and cannot be changed, so think of one that is secure and easy to remember. 6. Create a [employer] password. You can change your password at any time. 7. Enter your Password Reset Question and Answer. This can be used at a later time if you forget your password. 8. Enter your e-mail address. You will receive e-mail notification when new information is available in [address] 9. Click Sign Up. You can now view and download portions of your medical record. 10. Click the Download Summary menu link to download a portable copy of your medical information. If you have questions, please visit the Frequently Asked Questions section of the [employer] website. Remember, [employer] is NOT to be used for urgent needs. For medical emergencies, dial 911. Now available from your iPhone and Android! Please provide this summary of care documentation to your next provider. Your primary care clinician is listed as Melanie Primrose. If you have any questions after today's visit, please call [phone].

## 2018-02-01 LAB
ALBUMIN SERPL-MCNC: 4.5 G/DL (ref 3.5–5.5)
ALBUMIN/GLOB SERPL: 1.4 {RATIO} (ref 1.2–2.2)
ALP SERPL-CCNC: 84 IU/L (ref 39–117)
ALT SERPL-CCNC: 22 IU/L (ref 0–44)
AST SERPL-CCNC: 23 IU/L (ref 0–40)
BASOPHILS # BLD AUTO: 0 X10E3/UL (ref 0–0.2)
BASOPHILS NFR BLD AUTO: 0 %
BILIRUB SERPL-MCNC: 0.7 MG/DL (ref 0–1.2)
BUN SERPL-MCNC: 9 MG/DL (ref 6–24)
BUN/CREAT SERPL: 7 (ref 9–20)
CALCIUM SERPL-MCNC: 9.7 MG/DL (ref 8.7–10.2)
CHLORIDE SERPL-SCNC: 99 MMOL/L (ref 96–106)
CHOLEST SERPL-MCNC: 140 MG/DL (ref 100–199)
CO2 SERPL-SCNC: 27 MMOL/L (ref 18–29)
CREAT SERPL-MCNC: 1.21 MG/DL (ref 0.76–1.27)
EOSINOPHIL # BLD AUTO: 0.1 X10E3/UL (ref 0–0.4)
EOSINOPHIL NFR BLD AUTO: 1 %
ERYTHROCYTE [DISTWIDTH] IN BLOOD BY AUTOMATED COUNT: 13.4 % (ref 12.3–15.4)
GFR SERPLBLD CREATININE-BSD FMLA CKD-EPI: 65 ML/MIN/1.73
GFR SERPLBLD CREATININE-BSD FMLA CKD-EPI: 75 ML/MIN/1.73
GLOBULIN SER CALC-MCNC: 3.3 G/DL (ref 1.5–4.5)
GLUCOSE SERPL-MCNC: 108 MG/DL (ref 65–99)
HCT VFR BLD AUTO: 41 % (ref 37.5–51)
HCV AB S/CO SERPL IA: <0.1 S/CO RATIO (ref 0–0.9)
HDLC SERPL-MCNC: 45 MG/DL
HGB BLD-MCNC: 12.9 G/DL (ref 13–17.7)
IMM GRANULOCYTES # BLD: 0 X10E3/UL (ref 0–0.1)
IMM GRANULOCYTES NFR BLD: 0 %
LDLC SERPL CALC-MCNC: 80 MG/DL (ref 0–99)
LYMPHOCYTES # BLD AUTO: 3 X10E3/UL (ref 0.7–3.1)
LYMPHOCYTES NFR BLD AUTO: 35 %
MCH RBC QN AUTO: 25 PG (ref 26.6–33)
MCHC RBC AUTO-ENTMCNC: 31.5 G/DL (ref 31.5–35.7)
MCV RBC AUTO: 80 FL (ref 79–97)
MONOCYTES # BLD AUTO: 0.7 X10E3/UL (ref 0.1–0.9)
MONOCYTES NFR BLD AUTO: 8 %
NEUTROPHILS # BLD AUTO: 4.6 X10E3/UL (ref 1.4–7)
NEUTROPHILS NFR BLD AUTO: 56 %
PLATELET # BLD AUTO: 289 X10E3/UL (ref 150–379)
POTASSIUM SERPL-SCNC: 4.8 MMOL/L (ref 3.5–5.2)
PROT SERPL-MCNC: 7.8 G/DL (ref 6–8.5)
PSA SERPL-MCNC: 2.5 NG/ML (ref 0–4)
RBC # BLD AUTO: 5.16 X10E6/UL (ref 4.14–5.8)
SODIUM SERPL-SCNC: 139 MMOL/L (ref 134–144)
TRIGL SERPL-MCNC: 77 MG/DL (ref 0–149)
VLDLC SERPL CALC-MCNC: 15 MG/DL (ref 5–40)
WBC # BLD AUTO: 8.5 X10E3/UL (ref 3.4–10.8)

## 2018-02-06 ENCOUNTER — TELEPHONE (OUTPATIENT)
Dept: FAMILY MEDICINE CLINIC | Age: 58
End: 2018-02-06

## 2018-02-16 RX ORDER — ATORVASTATIN CALCIUM 40 MG/1
TABLET, FILM COATED ORAL
Qty: 90 TAB | Refills: 3 | Status: SHIPPED | OUTPATIENT
Start: 2018-02-16 | End: 2018-10-24 | Stop reason: SDUPTHER

## 2018-02-16 RX ORDER — DILTIAZEM HYDROCHLORIDE 300 MG/1
300 CAPSULE, COATED, EXTENDED RELEASE ORAL DAILY
Qty: 90 CAP | Refills: 1 | Status: SHIPPED | OUTPATIENT
Start: 2018-02-16 | End: 2018-03-19 | Stop reason: SDUPTHER

## 2018-03-12 RX ORDER — AMITRIPTYLINE HYDROCHLORIDE 50 MG/1
TABLET, FILM COATED ORAL
Qty: 90 TAB | Refills: 0 | Status: SHIPPED | OUTPATIENT
Start: 2018-03-12 | End: 2018-05-28 | Stop reason: SDUPTHER

## 2018-03-19 DIAGNOSIS — M10.9 ACUTE GOUT OF LEFT ELBOW, UNSPECIFIED CAUSE: ICD-10-CM

## 2018-03-19 NOTE — TELEPHONE ENCOUNTER
[facility] [address] [facility] told patient to call us for refills of Colchicine, Diltiazem, Enalapril, Gabapentin and HCTZ

## 2018-03-20 RX ORDER — DILTIAZEM HYDROCHLORIDE 300 MG/1
300 CAPSULE, COATED, EXTENDED RELEASE ORAL DAILY
Qty: 90 CAP | Refills: 3 | Status: SHIPPED | OUTPATIENT
Start: 2018-03-20 | End: 2018-11-06 | Stop reason: SDUPTHER

## 2018-03-20 RX ORDER — ENALAPRIL MALEATE 20 MG/1
TABLET ORAL
Qty: 90 TAB | Refills: 3 | Status: SHIPPED | OUTPATIENT
Start: 2018-03-20 | End: 2018-11-06 | Stop reason: SDUPTHER

## 2018-03-20 RX ORDER — HYDROCHLOROTHIAZIDE 25 MG/1
TABLET ORAL
Qty: 90 TAB | Refills: 3 | Status: SHIPPED | OUTPATIENT
Start: 2018-03-20 | End: 2018-11-06 | Stop reason: SDUPTHER

## 2018-03-20 RX ORDER — COLCHICINE 0.6 MG/1
TABLET ORAL
Qty: 90 TAB | Refills: 3 | Status: SHIPPED | OUTPATIENT
Start: 2018-03-20 | End: 2018-11-06 | Stop reason: SDUPTHER

## 2018-03-20 RX ORDER — GABAPENTIN 300 MG/1
300 CAPSULE ORAL 2 TIMES DAILY
Qty: 180 CAP | Refills: 3 | Status: SHIPPED | OUTPATIENT
Start: 2018-03-20 | End: 2018-11-06 | Stop reason: SDUPTHER

## 2018-03-29 ENCOUNTER — TELEPHONE (OUTPATIENT)
Dept: FAMILY MEDICINE CLINIC | Age: 58
End: 2018-03-29

## 2018-05-10 ENCOUNTER — TELEPHONE (OUTPATIENT)
Dept: FAMILY MEDICINE CLINIC | Age: 58
End: 2018-05-10

## 2018-05-10 NOTE — TELEPHONE ENCOUNTER
Pt wanted to know if he could come in on Monday and see someone. He can't raise his right arm. I mentioned to pt we are booked out and may have him call a different office, but I was going to check and see if we could work anything out.  Call [phone]

## 2018-05-22 ENCOUNTER — TELEPHONE (OUTPATIENT)
Dept: FAMILY MEDICINE CLINIC | Age: 58
End: 2018-05-22

## 2018-05-22 NOTE — TELEPHONE ENCOUNTER
Pt is has pain in the R arm and also his R knee gives out on him. He wanted to know if he could be seen on Monday June 4th?

## 2018-05-28 RX ORDER — AMITRIPTYLINE HYDROCHLORIDE 50 MG/1
TABLET, FILM COATED ORAL
Qty: 90 TAB | Refills: 0 | Status: SHIPPED | OUTPATIENT
Start: 2018-05-28 | End: 2018-08-30 | Stop reason: ALTCHOICE

## 2018-07-09 ENCOUNTER — TELEPHONE (OUTPATIENT)
Dept: FAMILY MEDICINE CLINIC | Age: 58
End: 2018-07-09

## 2018-07-09 NOTE — TELEPHONE ENCOUNTER
Patient's Flu shot has already been documented and the correct date was verified with Martinez Castillo / French Terry.

## 2018-07-24 ENCOUNTER — TELEPHONE (OUTPATIENT)
Dept: FAMILY MEDICINE CLINIC | Age: 58
End: 2018-07-24

## 2018-07-24 NOTE — TELEPHONE ENCOUNTER
PC to inform Mr. Agustin Berry that our office did receive the notes from Dr. Keya Ghotra office and will be placed on Yamel's desk for review. I will check also with Dolly Morgan to see whether or not he should see her for his arm pain that is still bothering him or go back to Dr. Velma Jimenez office? A VM was left to return the call.

## 2018-07-24 NOTE — TELEPHONE ENCOUNTER
SW Mrs. Nadira Terry, she was informed that [facility] agreed that Mr. Nadira Terry needs to give Dr. Fiordaliza Frye office a call since he is still having the problem with his arm. She stated OK because it is still bothering him even though he has had the shot.

## 2018-07-24 NOTE — TELEPHONE ENCOUNTER
SW Mrs. Nathan Villa, she was informed that I did not see any notes as of yet from Dr. Jeison Harman office, but when we get them, they will be reviewed by Xochitl Rogers and scanned into his chart. She stated OK and that he just wanted to make sure that Xochitl Rogers would have them for when he makes an appointment to see her so she will know what's going on with his visit with Dr. Tere Landin.

## 2018-08-08 ENCOUNTER — TELEPHONE (OUTPATIENT)
Dept: FAMILY MEDICINE CLINIC | Age: 58
End: 2018-08-08

## 2018-08-08 NOTE — TELEPHONE ENCOUNTER
Spoke with patient's wife Jonatan Han. Asking about results of xray that was done @ Dr Keya Ghotra office. Advised wife I am unable to give results from another provider she will need to contact Dr Keya Ghotra office for that information. She agreed to do so.

## 2018-10-02 NOTE — TELEPHONE ENCOUNTER
Called to speak with patient / wife. Left a message on cell voicemail. GEN - NAD; well appearing; A+O x3, anxious, crying  HEAD - NC/AT     EYES - EOMI, no conjunctival pallor, no scleral icterus  ENT -   mucous membranes  moist , no discharge      NECK - Neck supple  PULM - CTA b/l,  symmetric breath sounds  COR -  RRR, S1 S2, no murmurs  ABD - , ND, NT, soft, gravid abdomen, no guarding, no rebound, no masses    BACK - no CVA tenderness, nontender spine     EXTREMS - no edema, no deformity, warm and well perfused    SKIN - no rash or bruising      NEUROLOGIC - alert, sensation nl, motor 5/5 RUE/LUE/RLE/LLE

## 2018-10-24 PROBLEM — M47.816 SPONDYLOSIS OF LUMBAR REGION WITHOUT MYELOPATHY OR RADICULOPATHY: Status: ACTIVE | Noted: 2017-08-22

## 2018-11-06 PROBLEM — R35.1 NOCTURIA: Status: ACTIVE | Noted: 2018-11-06

## 2019-11-12 PROBLEM — I10 HTN (HYPERTENSION): Status: ACTIVE | Noted: 2019-11-12

## 2019-11-12 PROBLEM — Z72.0 TOBACCO ABUSE: Status: ACTIVE | Noted: 2019-11-12

## 2019-11-12 PROBLEM — K92.2 GI BLEED: Status: ACTIVE | Noted: 2019-11-12

## 2020-10-05 NOTE — MR AVS SNAPSHOT
Telephonic Nutrition Encounter    Due to COVID-19 ACTION PLAN, the patient's office visit was converted to a phone visit.    The patient consents to a telephone visit in place of an in office visit.     Patient states they are Rolly Soliman Sr. and are currently located at home during the course of our visit. Wife accompanied pt on call.    NUTRITION ASSESSMENT CONSULT    Reason for visit: uncontrolled DM, HTN, elevated triglycerides, obesity    Subjective: Reports some improvement in FB-209 rather than 250-300's previously. Trying to reduce carb intake. Feels like he lost weight. Does not have a scale at home.  Exercise: walks 2 miles/day.     Meal pattern:  Breakfast-plain oatmeal, blueberries, 2 sausage patties  Lunch-sandwich made with low carb bread  Dinner-chicken or beef, green vegetable, starch (has vegetables twice/week)  Snacks- sugar-free cookies      Wt Readings from Last 4 Encounters:   20 124 kg (273 lb 7.7 oz)   10/08/19 124 kg (273 lb 7.7 oz)   19 126.9 kg (279 lb 10.5 oz)   10/08/18 125.2 kg (276 lb 1.9 oz)       Patient Active Problem List   Diagnosis   • Benign essential hypertension   • Erectile dysfunction   • Hypertriglyceridemia   • Morbid obesity with BMI of 40.0-44.9, adult (CMS/[facility])   • Type 2 diabetes, uncontrolled, with diabetic cataract (CMS/[facility])   • Uncontrolled type 2 diabetes mellitus with circulatory disorder causing erectile dysfunction (CMS/[facility])   • Vitamin D deficiency   • Encounter for Medicare annual wellness exam   • Full code status   • Advance directive discussed with patient       Current Outpatient Medications   Medication Sig   • valsartan-hydroCHLOROthiazide (DIOVAN-HCT) 160-12.5 MG per tablet TAKE ONE TABLET BY MOUTH ONE TIME DAILY    • valsartan (DIOVAN) 320 MG tablet Take 1 tablet by mouth daily.   • betamethasone dipropionate (DIPROSONE) 0.05 % cream Apply topically 2 times daily.   • glipiZIDE (GLUCOTROL) 5 MG tablet Take 1 tablet by mouth 2 times  Visit Information Date & Time Provider Department Dept. Phone Encounter #  
 7/7/2017 11:30 AM Lizzeth Rosario, [address] [phone] [id number] Follow-up Instructions Return if symptoms worsen or fail to improve. Follow-up and Disposition History Upcoming Health Maintenance Date Due Hepatitis C Screening 1960 Pneumococcal 19-64 Medium Risk (1 of 1 - PPSV23) 6/14/1977 DTaP/Tdap/Td series (1 - Tdap) 6/14/1979 FOBT Q 1 YEAR AGE 50-75 7/14/2017 INFLUENZA AGE 9 TO ADULT 8/1/2017 Allergies as of 7/7/2017  Review Complete On: 7/7/2017 By: BJ Moser No Known Allergies Current Immunizations  Never Reviewed Name Date Influenza Vaccine 12/1/2016, 10/6/2014 Not reviewed this visit You Were Diagnosed With   
  
 Codes Comments Acute gout of left elbow, unspecified cause    -  Primary ICD-10-CM: M10.9 ICD-9-CM: 274.01 Vitals BP Pulse Temp Resp Height(growth percentile) Weight(growth percentile) 130/80 99 98.3 °F (36.8 °C) (Oral) 16 5' 9\" (1.753 m) 261 lb 6.4 oz (118.6 kg) SpO2 BMI Smoking Status 98% 38.6 kg/m2 Light Tobacco Smoker BMI and BSA Data Body Mass Index Body Surface Area  
 38.6 kg/m 2 2.4 m 2 Preferred Pharmacy Pharmacy Name Phone [address], [facility] [place] [address] [place] [phone] Your Updated Medication List  
  
   
This list is accurate as of: 7/7/17 12:22 PM.  Always use your most recent med list.  
  
  
  
  
 amitriptyline 50 mg tablet Commonly known as:  ELAVIL Take 1 Tab by mouth nightly. atorvastatin 40 mg tablet Commonly known as:  LIPITOR  
TAKE ONE TABLET BY MOUTH EVERY DAY  
  
 colchicine 0.6 mg tablet Commonly known as:  COLCRYS Take 2 tablets intially and then 1 tablet 1 hour later. Max # 3 tablets  Indications: acute gouty arthritis daily (before meals).   • Blood Glucose Monitoring Suppl (ONETOUCH VERIO FLEX SYSTEM) w/Device Kit 1 kit 2 (two) times a day.   • blood glucose (ONETOUCH VERIO) test strip Test sugar 2 times daily   • Lancet Devices (ONETOUCH DELICA PLUS LANCING) Misc 150 each 2 (two) times a day.   • blood glucose (CONTOUR TEST) test strip Test sugar 2 times daily   • Microlet Lancets Misc Test sugar 2 times daily   • fluticasone (FLONASE) 50 MCG/ACT nasal spray Spray 2 sprays in each nostril daily.   • metformin (GLUCOPHAGE) 1000 MG tablet Take 1 tablet by mouth 2 times daily (with meals).     No current facility-administered medications for this visit.      Hemoglobin A1C (%)   Date Value   02/17/2020 9.5 (H)       Assessment: estimated daily calories needed for weight reduction using Webster-St Jeor Equation = 1800    Topics Reviewed/Discussed:  1. Nutrition  - Food portions/label reading  - Plate method  - Carb counting guidelines    2. Monitoring   - Target goals for BG & A1C      Treatment Plan  -Aim for non-starchy vegetables twice/day  -Check food labels for total carbohydrate content    Follow up appt 11/3/20      Meeta Dong RD, [facility]      Time spent talking with patient during today's call: 52 minutes.   dilTIAZem  mg ER capsule Commonly known as:  CARDIZEM CD Take 1 Cap by mouth daily. Indications: HYPERTENSION  
  
 enalapril 20 mg tablet Commonly known as:  VASOTEC  
TAKE ONE TABLET BY MOUTH EVERY DAY  
  
 gabapentin 300 mg capsule Commonly known as:  NEURONTIN Take 300 mg by mouth two (2) times a day. hydroCHLOROthiazide 25 mg tablet Commonly known as:  HYDRODIURIL  
TAKE ONE TABLET BY MOUTH EVERY DAY  
  
 predniSONE 20 mg tablet Commonly known as:  DELTASONE  
3 tablets today then 2 tablets x 3 days then 1 tablet x 2 days Prescriptions Sent to Pharmacy Refills  
 colchicine (COLCRYS) 0.6 mg tablet 0 Sig: Take 2 tablets intially and then 1 tablet 1 hour later. Max # 3 tablets  Indications: acute gouty arthritis Class: Normal  
 Pharmacy: [address] [place] [address] [employer] [facility] Ph #: [phone]  
 predniSONE (DELTASONE) 20 mg tablet 0 Sig: 3 tablets today then 2 tablets x 3 days then 1 tablet x 2 days Class: Normal  
 Pharmacy: [address] [place] [address] [employer] [facility] Ph #: [phone] We Performed the Following CBC WITH AUTOMATED DIFF [01084 CPT(R)] METABOLIC PANEL, COMPREHENSIVE [23357 CPT(R)] URIC ACID [id number] CPT(R)] Follow-up Instructions Return if symptoms worsen or fail to improve. Introducing [facility] & HEALTH SERVICES! Haroldo Garner introduces Handshake patient portal. Now you can access parts of your medical record, email your doctor's office, and request medication refills online. 1. In your internet browser, go to [email or web address] 2. Click on the First Time User? Click Here link in the Sign In box. You will see the New Member Sign Up page. 3. Enter your Handshake Access Code exactly as it appears below. You will not need to use this code after youve completed the sign-up process.  If you do not sign up before the expiration date, you must request a new code. · MicroEdge Access Code: [email or web address] Expires: 8/29/2017  9:23 AM 
 
4. Enter the last four digits of your Social Security Number (xxxx) and Date of Birth (mm/dd/yyyy) as indicated and click Submit. You will be taken to the next sign-up page. 5. Create a MicroEdge ID. This will be your MicroEdge login ID and cannot be changed, so think of one that is secure and easy to remember. 6. Create a MicroEdge password. You can change your password at any time. 7. Enter your Password Reset Question and Answer. This can be used at a later time if you forget your password. 8. Enter your e-mail address. You will receive e-mail notification when new information is available in [address] 9. Click Sign Up. You can now view and download portions of your medical record. 10. Click the Download Summary menu link to download a portable copy of your medical information. If you have questions, please visit the Frequently Asked Questions section of the MicroEdge website. Remember, MicroEdge is NOT to be used for urgent needs. For medical emergencies, dial 911. Now available from your iPhone and Android! Please provide this summary of care documentation to your next provider. Your primary care clinician is listed as Efrain Nguyen. If you have any questions after today's visit, please call [phone].

## 2020-10-28 RX ORDER — DULOXETIN HYDROCHLORIDE 30 MG/1
30 CAPSULE, DELAYED RELEASE ORAL DAILY
Qty: 30 CAP | Refills: 2 | Status: SHIPPED | OUTPATIENT
Start: 2020-10-28 | End: 2020-11-27 | Stop reason: SDUPTHER

## 2021-04-08 RX ORDER — DULOXETIN HYDROCHLORIDE 30 MG/1
30 CAPSULE, DELAYED RELEASE ORAL DAILY
Qty: 90 CAP | Refills: 3 | Status: SHIPPED | OUTPATIENT
Start: 2021-04-08 | End: 2021-10-06 | Stop reason: SDUPTHER

## 2021-05-03 ENCOUNTER — TELEPHONE (OUTPATIENT)
Dept: FAMILY MEDICINE CLINIC | Age: 61
End: 2021-05-03

## 2021-05-03 NOTE — TELEPHONE ENCOUNTER
Patients wife is requesting his colonoscopy to be in [place] in July due to transportation issues, cant go to [facility]

## 2021-05-03 NOTE — TELEPHONE ENCOUNTER
----- Message from Alyse Dakin sent at 4/30/2021 10:35 AM EDT -----  Regarding: Mariely Szymanski/Telephone  General Message/Vendor Calls    Caller's first and last name:Jessica Estes (Spouse)      Reason for call: requesting to know the last dr who did the pt's colonoscopy in [address] required yes/no and why: yes      Best contact number(s): [phone]      Details to clarify the request: cc is for Lali Foster

## 2021-09-17 RX ORDER — DILTIAZEM HYDROCHLORIDE 300 MG/1
CAPSULE, COATED, EXTENDED RELEASE ORAL
Qty: 90 CAPSULE | Refills: 3 | Status: SHIPPED | OUTPATIENT
Start: 2021-09-17 | End: 2022-01-11 | Stop reason: SDUPTHER

## 2021-09-17 RX ORDER — ENALAPRIL MALEATE 20 MG/1
TABLET ORAL
Qty: 90 TABLET | Refills: 3 | Status: SHIPPED | OUTPATIENT
Start: 2021-09-17 | End: 2022-01-11 | Stop reason: SDUPTHER

## 2021-09-17 RX ORDER — ATORVASTATIN CALCIUM 40 MG/1
TABLET, FILM COATED ORAL
Qty: 90 TABLET | Refills: 3 | Status: SHIPPED | OUTPATIENT
Start: 2021-09-17 | End: 2022-01-11 | Stop reason: SDUPTHER

## 2021-09-17 RX ORDER — HYDROCHLOROTHIAZIDE 25 MG/1
TABLET ORAL
Qty: 90 TABLET | Refills: 3 | Status: SHIPPED | OUTPATIENT
Start: 2021-09-17 | End: 2022-01-11 | Stop reason: SDUPTHER

## 2021-10-06 ENCOUNTER — VIRTUAL VISIT (OUTPATIENT)
Dept: FAMILY MEDICINE CLINIC | Age: 61
End: 2021-10-06
Payer: COMMERCIAL

## 2021-10-06 DIAGNOSIS — M47.816 SPONDYLOSIS OF LUMBAR REGION WITHOUT MYELOPATHY OR RADICULOPATHY: Primary | ICD-10-CM

## 2021-10-06 PROCEDURE — 99442 PR PHYS/QHP TELEPHONE EVALUATION 11-20 MIN: CPT | Performed by: NURSE PRACTITIONER

## 2021-10-06 RX ORDER — AMITRIPTYLINE HYDROCHLORIDE 50 MG/1
TABLET, FILM COATED ORAL
Qty: 90 TABLET | Refills: 0 | Status: SHIPPED | OUTPATIENT
Start: 2021-10-06 | End: 2021-10-11 | Stop reason: SDUPTHER

## 2021-10-06 RX ORDER — DULOXETIN HYDROCHLORIDE 30 MG/1
60 CAPSULE, DELAYED RELEASE ORAL DAILY
Qty: 90 CAPSULE | Refills: 1 | Status: SHIPPED | OUTPATIENT
Start: 2021-10-06 | End: 2021-11-09 | Stop reason: SDUPTHER

## 2021-10-06 NOTE — PROGRESS NOTES
Chief Complaint   Patient presents with    Back Pain     1. Have you been to the ER, urgent care clinic since your last visit? Hospitalized since your last visit? no    2. Have you seen or consulted any other health care providers outside of the [address] [place] [address] since your last visit? Include any pap smears or colon screening. No  Abuse Screening Questionnaire 10/6/2021   Do you ever feel afraid of your partner? N   Are you in a relationship with someone who physically or mentally threatens you? N   Is it safe for you to go home?  Y     3 most recent PHQ Screens 10/6/2021   Little interest or pleasure in doing things Not at all   Feeling down, depressed, irritable, or hopeless Not at all   Total Score PHQ 2 0

## 2021-10-10 NOTE — PROGRESS NOTES
Kamilah Talbert is a 59 y.o. male, evaluated via audio-only technology on 10/6/2021 for Back Pain  . back pain requesting refill duloxetine     Assessment & Plan:   Diagnoses and all orders for this visit:    1. Spondylosis of lumbar region without myelopathy or radiculopathy    Other orders  -     DULoxetine (CYMBALTA) 30 mg capsule; Take 2 Capsules by mouth daily. Indications: chronic muscle or bone pain, neuropathic pain  -     amitriptyline (ELAVIL) 50 mg tablet; TAKE 1 TABLET NIGHTLY  Indications: neuropathic pain      The complexity of medical decision making for this visit is moderate         12  Subjective:       Prior to Admission medications    Medication Sig Start Date End Date Taking? Authorizing Provider   DULoxetine (CYMBALTA) 30 mg capsule Take 2 Capsules by mouth daily. Indications: chronic muscle or bone pain, neuropathic pain 10/6/21  Yes Rosalind Caed NP   amitriptyline (ELAVIL) 50 mg tablet TAKE 1 TABLET NIGHTLY  Indications: neuropathic pain 10/6/21  Yes Rosalind Cade NP   hydroCHLOROthiazide (HYDRODIURIL) 25 mg tablet TAKE 1 TABLET DAILY FOR    HIGH BLOOD PRESSURE 9/17/21  Yes Rosalind Cade NP   dilTIAZem ER (CARDIZEM CD) 300 mg capsule TAKE 1 CAPSULE DAILY FOR   HIGH BLOOD PRESSURE 9/17/21  Yes Rosalind Cade NP   enalapril (VASOTEC) 20 mg tablet TAKE 1 TABLET DAILY 9/17/21  Yes Rosalind Cade NP   atorvastatin (LIPITOR) 40 mg tablet TAKE 1 TABLET DAILY 9/17/21  Yes Rosalind Cade NP   colchicine (Colcrys) 0.6 mg tablet Take 2 tablets intially and then 1 tablet 1 hour later.   Max # 3 tablets  Indications: acute inflammation of the joints due to gout attack 1/18/21  Yes Rosalind Cade NP     Patient Active Problem List   Diagnosis Code    Arthritis M19.90    Esophagitis determined by endoscopy K20.90    Hyperlipemia E78.5    Diverticulosis large intestine w/o perforation or abscess w/o bleeding K57.30    Obesity, morbid ([address] E66.01    Benign essential hypertension I10    Lumbar radiculopathy M54.16    Spondylosis of lumbar region without myelopathy or radiculopathy M47.816    Nocturia R35.1    GI bleed K92.2    HTN (hypertension) I10    Tobacco abuse Z72.0     Patient Active Problem List    Diagnosis Date Noted    GI bleed 11/12/2019    HTN (hypertension) 11/12/2019    Tobacco abuse 11/12/2019    Nocturia 11/06/2018    Obesity, morbid ([address] 01/22/2018    Spondylosis of lumbar region without myelopathy or radiculopathy 08/22/2017    Diverticulosis large intestine w/o perforation or abscess w/o bleeding 07/20/2015    Hyperlipemia 05/07/2015    Esophagitis determined by endoscopy 05/06/2015    Arthritis     Benign essential hypertension 08/13/2013    Lumbar radiculopathy 08/13/2013     Current Outpatient Medications   Medication Sig Dispense Refill    DULoxetine (CYMBALTA) 30 mg capsule Take 2 Capsules by mouth daily. Indications: chronic muscle or bone pain, neuropathic pain 90 Capsule 1    amitriptyline (ELAVIL) 50 mg tablet TAKE 1 TABLET NIGHTLY  Indications: neuropathic pain 90 Tablet 0    hydroCHLOROthiazide (HYDRODIURIL) 25 mg tablet TAKE 1 TABLET DAILY FOR    HIGH BLOOD PRESSURE 90 Tablet 3    dilTIAZem ER (CARDIZEM CD) 300 mg capsule TAKE 1 CAPSULE DAILY FOR   HIGH BLOOD PRESSURE 90 Capsule 3    enalapril (VASOTEC) 20 mg tablet TAKE 1 TABLET DAILY 90 Tablet 3    atorvastatin (LIPITOR) 40 mg tablet TAKE 1 TABLET DAILY 90 Tablet 3    colchicine (Colcrys) 0.6 mg tablet Take 2 tablets intially and then 1 tablet 1 hour later.   Max # 3 tablets  Indications: acute inflammation of the joints due to gout attack 10 Tab 3     No Known Allergies  Past Medical History:   Diagnosis Date    Arthritis     Diverticulosis     Enureses     Gout     Hypercholesterolemia     Hypertension     Sciatica      Past Surgical History:   Procedure Laterality Date    HX COLONOSCOPY  022011    HX COLONOSCOPY  8/2015    polyp x1     Family History   Problem Relation Age of Onset    Diabetes Mother     Heart Disease Mother     Cancer Father         prostate     Social History     Tobacco Use    Smoking status: Light Tobacco Smoker     Types: Cigarettes    Smokeless tobacco: Never Used    Tobacco comment: occassionally   Substance Use Topics    Alcohol use: Yes       ROS  Pertinent items are noted on the HPI  No flowsheet data found. [address], who was evaluated through a patient-initiated, synchronous (real-time) audio only encounter, and/or her healthcare decision maker, is aware that it is a billable service, with coverage as determined by his insurance carrier. He provided verbal consent to proceed: Yes. He has not had a related appointment within my department in the past 7 days or scheduled within the next 24 hours. On this date 10/06/2021 I have spent 20 minutes reviewing previous notes, test results and face to face (virtual) with the patient discussing the diagnosis and importance of compliance with the treatment plan as well as documenting on the day of the visit.     Rosalinda Drummond NP

## 2021-10-11 RX ORDER — AMITRIPTYLINE HYDROCHLORIDE 50 MG/1
TABLET, FILM COATED ORAL
Qty: 90 TABLET | Refills: 0 | Status: SHIPPED | OUTPATIENT
Start: 2021-10-11 | End: 2021-11-11 | Stop reason: SDUPTHER

## 2021-10-11 NOTE — TELEPHONE ENCOUNTER
Message routed to provider to approve refill.     Requested Prescriptions     Pending Prescriptions Disp Refills    amitriptyline (ELAVIL) 50 mg tablet 90 Tablet 0     Sig: TAKE 1 TABLET NIGHTLY  Indications: neuropathic pain

## 2021-10-11 NOTE — TELEPHONE ENCOUNTER
----- Message from Festus Schultz sent at 10/11/2021 10:40 AM EDT -----  Regarding: NP Syzmanski/Telephone  Contact: [phone]  Medication Refill    Caller (if not patient): Martha Walter      Relationship of caller (if not patient): wife      Best contact number(s): [phone]      Name of medication and dosage if known: \" amitriptyline 50 mg \" \" Gabapentin 300 mg \"      Is patient out of this medication (yes/no): Yes      Pharmacy name: [address] [place] [address] listed in chart? (yes/no):  Pharmacy phone number: [phone]        Details to clarify the request: Patient wife stated \"patient needs a 90 days supply for both amitriptyline and Gabapentin 300 mg.       Festus Schultz

## 2021-10-15 ENCOUNTER — TELEPHONE (OUTPATIENT)
Dept: FAMILY MEDICINE CLINIC | Age: 61
End: 2021-10-15

## 2021-11-04 ENCOUNTER — TELEPHONE (OUTPATIENT)
Dept: FAMILY MEDICINE CLINIC | Age: 61
End: 2021-11-04

## 2021-11-04 NOTE — TELEPHONE ENCOUNTER
Pt wife calling about a medication for his face spasms .  She did not know the name of the medication states the doctor would know

## 2021-11-09 ENCOUNTER — TELEPHONE (OUTPATIENT)
Dept: FAMILY MEDICINE CLINIC | Age: 61
End: 2021-11-09

## 2021-11-09 RX ORDER — DULOXETIN HYDROCHLORIDE 60 MG/1
60 CAPSULE, DELAYED RELEASE ORAL 2 TIMES DAILY
Qty: 180 CAPSULE | Refills: 1 | Status: SHIPPED | OUTPATIENT
Start: 2021-11-09 | End: 2022-01-11 | Stop reason: SDUPTHER

## 2021-11-12 RX ORDER — AMITRIPTYLINE HYDROCHLORIDE 50 MG/1
TABLET, FILM COATED ORAL
Qty: 90 TABLET | Refills: 0 | Status: SHIPPED | OUTPATIENT
Start: 2021-11-12

## 2022-01-11 DIAGNOSIS — M10.00 IDIOPATHIC GOUT, UNSPECIFIED CHRONICITY, UNSPECIFIED SITE: ICD-10-CM

## 2022-01-11 RX ORDER — HYDROCHLOROTHIAZIDE 25 MG/1
TABLET ORAL
Qty: 90 TABLET | Refills: 3 | Status: SHIPPED | OUTPATIENT
Start: 2022-01-11 | End: 2022-10-18 | Stop reason: SDUPTHER

## 2022-01-11 RX ORDER — DILTIAZEM HYDROCHLORIDE 300 MG/1
CAPSULE, COATED, EXTENDED RELEASE ORAL
Qty: 90 CAPSULE | Refills: 3 | Status: SHIPPED | OUTPATIENT
Start: 2022-01-11 | End: 2022-10-18 | Stop reason: SDUPTHER

## 2022-01-11 RX ORDER — ATORVASTATIN CALCIUM 40 MG/1
40 TABLET, FILM COATED ORAL DAILY
Qty: 90 TABLET | Refills: 3 | Status: SHIPPED | OUTPATIENT
Start: 2022-01-11 | End: 2022-10-18

## 2022-01-11 RX ORDER — ENALAPRIL MALEATE 20 MG/1
TABLET ORAL
Qty: 90 TABLET | Refills: 3 | Status: SHIPPED | OUTPATIENT
Start: 2022-01-11 | End: 2022-10-18 | Stop reason: SDUPTHER

## 2022-01-11 RX ORDER — DULOXETIN HYDROCHLORIDE 60 MG/1
60 CAPSULE, DELAYED RELEASE ORAL 2 TIMES DAILY
Qty: 180 CAPSULE | Refills: 1 | Status: SHIPPED | OUTPATIENT
Start: 2022-01-11 | End: 2022-04-21

## 2022-01-11 NOTE — TELEPHONE ENCOUNTER
Pts wife called in for a refill all of his medications. Amitriptyline, atorvastatin, colchicine, diltiazem er, duloxetine, enalapril, and hydrochlorothiazide. Call back # [phone]. Uses [facility].

## 2022-01-18 ENCOUNTER — TELEPHONE (OUTPATIENT)
Dept: FAMILY MEDICINE CLINIC | Age: 62
End: 2022-01-18

## 2022-01-18 NOTE — TELEPHONE ENCOUNTER
Pt wife calling to speak with nurse in re to patient having green stools please call back at [phone]

## 2022-01-18 NOTE — TELEPHONE ENCOUNTER
Called and spoke to wife, stools have been green in color, pt has been eating lot of vegetables. No other issues, no stomach discomfort. Advised it is normal for stool colors to change with the foods we eat. Wife verb understanding.

## 2022-01-20 DIAGNOSIS — M10.00 IDIOPATHIC GOUT, UNSPECIFIED CHRONICITY, UNSPECIFIED SITE: ICD-10-CM

## 2022-01-21 RX ORDER — COLCHICINE 0.6 MG/1
TABLET ORAL
Qty: 10 TABLET | Refills: 3 | Status: SHIPPED | OUTPATIENT
Start: 2022-01-21 | End: 2022-01-24 | Stop reason: SDUPTHER

## 2022-01-24 DIAGNOSIS — M10.00 IDIOPATHIC GOUT, UNSPECIFIED CHRONICITY, UNSPECIFIED SITE: ICD-10-CM

## 2022-01-24 RX ORDER — COLCHICINE 0.6 MG/1
TABLET ORAL
Qty: 10 TABLET | Refills: 3 | Status: SHIPPED | OUTPATIENT
Start: 2022-01-24 | End: 2022-01-31 | Stop reason: SDUPTHER

## 2022-01-28 ENCOUNTER — TELEPHONE (OUTPATIENT)
Dept: FAMILY MEDICINE CLINIC | Age: 62
End: 2022-01-28

## 2022-01-28 NOTE — TELEPHONE ENCOUNTER
Patient is calling stating a mail order refill was done for colchicine, he only rec'd 10 tablets, patient is stating this should have been a 90 day supply.  His call back # is [phone]

## 2022-01-30 NOTE — TELEPHONE ENCOUNTER
He should not take colchicine daily ,the instructions are for an acute gout attack. Total of 4 pills for each attack. If he is requesting a daily gout medication. Please ask him to schedule a VV to review his medications.  Thanks DL

## 2022-01-31 ENCOUNTER — VIRTUAL VISIT (OUTPATIENT)
Dept: FAMILY MEDICINE CLINIC | Age: 62
End: 2022-01-31
Payer: COMMERCIAL

## 2022-01-31 DIAGNOSIS — M10.00 IDIOPATHIC GOUT, UNSPECIFIED CHRONICITY, UNSPECIFIED SITE: ICD-10-CM

## 2022-01-31 PROCEDURE — 99443 PR PHYS/QHP TELEPHONE EVALUATION 21-30 MIN: CPT | Performed by: NURSE PRACTITIONER

## 2022-01-31 RX ORDER — COLCHICINE 0.6 MG/1
TABLET ORAL
Qty: 28 TABLET | Refills: 3 | Status: SHIPPED | OUTPATIENT
Start: 2022-01-31

## 2022-01-31 NOTE — PROGRESS NOTES
Chief Complaint   Patient presents with    Gout     medication evaluation     1. Have you been to the ER, urgent care clinic since your last visit? Hospitalized since your last visit? no    2. Have you seen or consulted any other health care providers outside of the [address] [place] [address] since your last visit? Include any pap smears or colon screening. No  Abuse Screening Questionnaire 1/31/2022   Do you ever feel afraid of your partner? N   Are you in a relationship with someone who physically or mentally threatens you? N   Is it safe for you to go home?  Y     3 most recent PHQ Screens 1/31/2022   Little interest or pleasure in doing things Not at all   Feeling down, depressed, irritable, or hopeless Not at all   Total Score PHQ 2 0

## 2022-01-31 NOTE — ACP (ADVANCE CARE PLANNING)
Discussed importance of advanced medical directives with patient. Patient is capable of making decisions.   Sergio Portillo NP-C

## 2022-01-31 NOTE — PROGRESS NOTES
Faith Gutierrez is a 59 y.o. male, evaluated via audio-only technology on 1/31/2022 for Gout (medication evaluation)  . He is not currently having a flare but would like to discuss his medication. He currently takes Colcrys during acute attacks. He was prescribed this medication 1/24/2022. Assessment & Plan:   Diagnoses and all orders for this visit:    1. Idiopathic gout, unspecified chronicity, unspecified site  -     colchicine (Colcrys) 0.6 mg tablet; Take 2 tablets intially and then 1 tablet 1 hour later. Max # 3 tablets  Indications: acute inflammation of the joints due to gout attack    Patient does not want daily medication, would like to continue current management of acute gout episodes. Patient will follow up as needed. 12  Subjective:       Prior to Admission medications    Medication Sig Start Date End Date Taking? Authorizing Provider   colchicine (Colcrys) 0.6 mg tablet Take 2 tablets intially and then 1 tablet 1 hour later. Max # 3 tablets  Indications: acute inflammation of the joints due to gout attack 1/31/22  Yes Yovany Herring NP   hydroCHLOROthiazide (HYDRODIURIL) 25 mg tablet TAKE 1 TABLET DAILY FOR    HIGH BLOOD PRESSURE 1/11/22  Yes Yovany Herring NP   atorvastatin (LIPITOR) 40 mg tablet Take 1 Tablet by mouth daily. 1/11/22  Yes Yovany Herring NP   DULoxetine (CYMBALTA) 60 mg capsule Take 1 Capsule by mouth two (2) times a day.  Indications: chronic muscle or bone pain, neuropathic pain 1/11/22  Yes Van HOYOS NP   enalapril (VASOTEC) 20 mg tablet TAKE 1 TABLET DAILY 1/11/22  Yes Yovany Herring NP   dilTIAZem ER (CARDIZEM CD) 300 mg capsule TAKE 1 CAPSULE DAILY FOR   HIGH BLOOD PRESSURE 1/11/22  Yes Yovany Herring NP   amitriptyline (ELAVIL) 50 mg tablet TAKE 1 TABLET NIGHTLY  Indications: neuropathic pain 11/12/21  Yes Yovany Herring NP     Patient Active Problem List   Diagnosis Code    Arthritis M19.90    Esophagitis determined by endoscopy K20.90    Hyperlipemia E78.5    Diverticulosis large intestine w/o perforation or abscess w/o bleeding K57.30    Obesity, morbid (H[facility]) E66.01    Benign essential hypertension I10    Lumbar radiculopathy M54.16    Spondylosis of lumbar region without myelopathy or radiculopathy M47.816    Nocturia R35.1    GI bleed K92.2    HTN (hypertension) I10    Tobacco abuse Z72.0       ROS -Pertinent information in HPI    No data recorded     Angelique Fraser Sr, who was evaluated through a patient-initiated, synchronous (real-time) audio only encounter, and/or her healthcare decision maker, is aware that it is a billable service, which includes applicable co-pays, with coverage as determined by his insurance carrier. He provided verbal consent to proceed. He has not had a related appointment within my department in the past 7 days or scheduled within the next 24 hours. The patient was located at home in a state where the provider was licensed to provide care. On this date 01/31/2022 I have spent 30 minutes reviewing previous notes, test results and face to face (virtual) with the patient discussing the diagnosis and importance of compliance with the treatment plan as well as documenting on the day of the visit.     PABLO Sapp

## 2022-02-02 ENCOUNTER — TELEPHONE (OUTPATIENT)
Dept: FAMILY MEDICINE CLINIC | Age: 62
End: 2022-02-02

## 2022-02-02 NOTE — TELEPHONE ENCOUNTER
Patient c/o pain in back and legs and wants to take 2 500 mg of Tylenol  , has taken in past without issues is this OK, please advise

## 2022-02-02 NOTE — TELEPHONE ENCOUNTER
Patient's wife is calling to see if any of his medications contain acetaminophen, he is wanting to take 2 pain pills and wants advice on what to take.  His call back # is [phone]

## 2022-03-18 PROBLEM — I10 HTN (HYPERTENSION): Status: ACTIVE | Noted: 2019-11-12

## 2022-03-19 PROBLEM — R35.1 NOCTURIA: Status: ACTIVE | Noted: 2018-11-06

## 2022-03-19 PROBLEM — Z72.0 TOBACCO ABUSE: Status: ACTIVE | Noted: 2019-11-12

## 2022-03-19 PROBLEM — K92.2 GI BLEED: Status: ACTIVE | Noted: 2019-11-12

## 2022-03-19 PROBLEM — E66.01 OBESITY, MORBID (HCC): Status: ACTIVE | Noted: 2018-01-22

## 2022-03-20 PROBLEM — M47.816 SPONDYLOSIS OF LUMBAR REGION WITHOUT MYELOPATHY OR RADICULOPATHY: Status: ACTIVE | Noted: 2017-08-22

## 2022-04-11 RX ORDER — DULOXETIN HYDROCHLORIDE 30 MG/1
CAPSULE, DELAYED RELEASE ORAL
Qty: 90 CAPSULE | Refills: 3 | Status: SHIPPED | OUTPATIENT
Start: 2022-04-11 | End: 2022-04-21

## 2022-04-21 ENCOUNTER — OFFICE VISIT (OUTPATIENT)
Dept: FAMILY MEDICINE CLINIC | Age: 62
End: 2022-04-21
Payer: MEDICARE

## 2022-04-21 VITALS
BODY MASS INDEX: 39.22 KG/M2 | WEIGHT: 274 LBS | OXYGEN SATURATION: 97 % | HEART RATE: 88 BPM | HEIGHT: 70 IN | SYSTOLIC BLOOD PRESSURE: 152 MMHG | TEMPERATURE: 97.5 F | DIASTOLIC BLOOD PRESSURE: 100 MMHG

## 2022-04-21 DIAGNOSIS — M54.32 SCIATICA OF LEFT SIDE: Primary | ICD-10-CM

## 2022-04-21 DIAGNOSIS — R35.1 NOCTURIA: ICD-10-CM

## 2022-04-21 DIAGNOSIS — E78.5 HYPERLIPIDEMIA, UNSPECIFIED HYPERLIPIDEMIA TYPE: ICD-10-CM

## 2022-04-21 DIAGNOSIS — M19.90 ARTHRITIS: ICD-10-CM

## 2022-04-21 PROCEDURE — 96372 THER/PROPH/DIAG INJ SC/IM: CPT | Performed by: NURSE PRACTITIONER

## 2022-04-21 PROCEDURE — G8432 DEP SCR NOT DOC, RNG: HCPCS | Performed by: NURSE PRACTITIONER

## 2022-04-21 PROCEDURE — 3017F COLORECTAL CA SCREEN DOC REV: CPT | Performed by: NURSE PRACTITIONER

## 2022-04-21 PROCEDURE — G8753 SYS BP > OR = 140: HCPCS | Performed by: NURSE PRACTITIONER

## 2022-04-21 PROCEDURE — 99214 OFFICE O/P EST MOD 30 MIN: CPT | Performed by: NURSE PRACTITIONER

## 2022-04-21 PROCEDURE — G8427 DOCREV CUR MEDS BY ELIG CLIN: HCPCS | Performed by: NURSE PRACTITIONER

## 2022-04-21 PROCEDURE — G8755 DIAS BP > OR = 90: HCPCS | Performed by: NURSE PRACTITIONER

## 2022-04-21 PROCEDURE — G8417 CALC BMI ABV UP PARAM F/U: HCPCS | Performed by: NURSE PRACTITIONER

## 2022-04-21 RX ORDER — KETOROLAC TROMETHAMINE 10 MG/1
10 TABLET, FILM COATED ORAL
Qty: 20 TABLET | Refills: 0 | Status: SHIPPED | OUTPATIENT
Start: 2022-04-21 | End: 2022-05-04 | Stop reason: SDUPTHER

## 2022-04-21 RX ORDER — KETOROLAC TROMETHAMINE 30 MG/ML
30 INJECTION, SOLUTION INTRAMUSCULAR; INTRAVENOUS
Status: COMPLETED | OUTPATIENT
Start: 2022-04-21 | End: 2022-04-21

## 2022-04-21 RX ORDER — DULOXETIN HYDROCHLORIDE 60 MG/1
60 CAPSULE, DELAYED RELEASE ORAL 2 TIMES DAILY
Qty: 180 CAPSULE | Refills: 1 | Status: SHIPPED | OUTPATIENT
Start: 2022-04-21 | End: 2022-05-04 | Stop reason: SDUPTHER

## 2022-04-21 RX ADMIN — KETOROLAC TROMETHAMINE 30 MG: 30 INJECTION, SOLUTION INTRAMUSCULAR; INTRAVENOUS at 11:49

## 2022-04-21 NOTE — PROGRESS NOTES
1. \"Have you been to the ER, urgent care clinic since your last visit? Hospitalized since your last visit? \" No    2. \"Have you seen or consulted any other health care providers outside of the [address] [place] [address] since your last visit? \"  Yes DR Paul pain specialist      3. For patients aged 39-70: Has the patient had a colonoscopy / FIT/ Cologuard? Yes - Care Gap present. Most recent result on file      If the patient is female:    4. For patients aged 41-77: Has the patient had a mammogram within the past 2 years? NA - based on age or sex      11. For patients aged 21-65: Has the patient had a pap smear?  NA - based on age or sex

## 2022-04-22 LAB
ALBUMIN SERPL-MCNC: 4.2 G/DL (ref 3.5–5)
ALBUMIN/GLOB SERPL: 1.2 {RATIO} (ref 1.1–2.2)
ALP SERPL-CCNC: 100 U/L (ref 45–117)
ALT SERPL-CCNC: 36 U/L (ref 12–78)
ANION GAP SERPL CALC-SCNC: 4 MMOL/L (ref 5–15)
AST SERPL-CCNC: 21 U/L (ref 15–37)
BASOPHILS # BLD: 0.1 K/UL (ref 0–0.1)
BASOPHILS NFR BLD: 1 % (ref 0–1)
BILIRUB SERPL-MCNC: 0.4 MG/DL (ref 0.2–1)
BUN SERPL-MCNC: 8 MG/DL (ref 6–20)
BUN/CREAT SERPL: 6 (ref 12–20)
CALCIUM SERPL-MCNC: 9.6 MG/DL (ref 8.5–10.1)
CHLORIDE SERPL-SCNC: 103 MMOL/L (ref 97–108)
CHOLEST SERPL-MCNC: 139 MG/DL
CO2 SERPL-SCNC: 28 MMOL/L (ref 21–32)
CREAT SERPL-MCNC: 1.26 MG/DL (ref 0.7–1.3)
DIFFERENTIAL METHOD BLD: ABNORMAL
EOSINOPHIL # BLD: 0.1 K/UL (ref 0–0.4)
EOSINOPHIL NFR BLD: 1 % (ref 0–7)
ERYTHROCYTE [DISTWIDTH] IN BLOOD BY AUTOMATED COUNT: 14 % (ref 11.5–14.5)
GLOBULIN SER CALC-MCNC: 3.6 G/DL (ref 2–4)
GLUCOSE SERPL-MCNC: 158 MG/DL (ref 65–100)
HCT VFR BLD AUTO: 40.5 % (ref 36.6–50.3)
HDLC SERPL-MCNC: 57 MG/DL
HDLC SERPL: 2.4 {RATIO} (ref 0–5)
HGB BLD-MCNC: 11.7 G/DL (ref 12.1–17)
IMM GRANULOCYTES # BLD AUTO: 0 K/UL (ref 0–0.04)
IMM GRANULOCYTES NFR BLD AUTO: 0 % (ref 0–0.5)
LDLC SERPL CALC-MCNC: 64.4 MG/DL (ref 0–100)
LYMPHOCYTES # BLD: 2.6 K/UL (ref 0.8–3.5)
LYMPHOCYTES NFR BLD: 27 % (ref 12–49)
MCH RBC QN AUTO: 25.2 PG (ref 26–34)
MCHC RBC AUTO-ENTMCNC: 28.9 G/DL (ref 30–36.5)
MCV RBC AUTO: 87.3 FL (ref 80–99)
MONOCYTES # BLD: 0.8 K/UL (ref 0–1)
MONOCYTES NFR BLD: 8 % (ref 5–13)
NEUTS SEG # BLD: 6.1 K/UL (ref 1.8–8)
NEUTS SEG NFR BLD: 63 % (ref 32–75)
NRBC # BLD: 0 K/UL (ref 0–0.01)
NRBC BLD-RTO: 0 PER 100 WBC
PLATELET # BLD AUTO: 311 K/UL (ref 150–400)
PMV BLD AUTO: 9.7 FL (ref 8.9–12.9)
POTASSIUM SERPL-SCNC: 4.6 MMOL/L (ref 3.5–5.1)
PROT SERPL-MCNC: 7.8 G/DL (ref 6.4–8.2)
RBC # BLD AUTO: 4.64 M/UL (ref 4.1–5.7)
RBC MORPH BLD: ABNORMAL
SODIUM SERPL-SCNC: 135 MMOL/L (ref 136–145)
TRIGL SERPL-MCNC: 88 MG/DL (ref ?–150)
VLDLC SERPL CALC-MCNC: 17.6 MG/DL
WBC # BLD AUTO: 9.7 K/UL (ref 4.1–11.1)

## 2022-04-24 NOTE — PROGRESS NOTES
Metabolic panel very mild decline in kidney function - Use tylenol as first choice for pain reliever. Only use NSAIDS when necessary. Cholesterol level is excellent ! CBC chronic anemia recommend slow fe daily OTC .

## 2022-04-24 NOTE — PATIENT INSTRUCTIONS
Arthritis: Care Instructions  Overview     Arthritis, also called osteoarthritis, is a breakdown of the cartilage that cushions your joints. When the cartilage wears down, your bones rub against each other. This causes pain and stiffness. Many people have some arthritis as they age. Arthritis most often affects the joints of the spine, hands, hips, knees, or feet. Arthritis never goes away completely. But medicine and home treatment can help reduce pain and help you stay active. Follow-up care is a key part of your treatment and safety. Be sure to make and go to all appointments, and call your doctor if you are having problems. It's also a good idea to know your test results and keep a list of the medicines you take. How can you care for yourself at home? · Stay at a healthy weight. Being overweight puts extra strain on your joints. · Talk to your doctor or physical therapist about exercises that will help ease joint pain. ? Stretch. You may enjoy gentle forms of yoga to help keep your joints and muscles flexible. ? Walk instead of jog. Other types of exercise that are less stressful on the joints include riding a bike, swimming, chandrika chi, or water exercise. ? Lift weights. Strong muscles help reduce stress on your joints. Stronger thigh muscles, for example, take some of the stress off of the knees and hips. Learn the right way to lift weights so you don't make joint pain worse. · Take your medicines exactly as prescribed. Call your doctor if you think you are having a problem with your medicine. · Take pain medicines exactly as directed. ? If the doctor gave you a prescription medicine for pain, take it as prescribed. ? If you are not taking a prescription pain medicine, ask your doctor if you can take an over-the-counter medicine. · Use a cane, crutch, walker, or another device if you need help to get around. These can help rest your joints.  You also can use other things to make life easier, such as a higher toilet seat and padded handles on kitchen utensils. · Do not sit in low chairs. They can make it hard to get up. · Put heat or cold on your sore joints as needed. Use whichever helps you most. You can also switch between hot and cold packs. ? Apply heat 2 or 3 times a day for 20 to 30 minutes--using a heating pad, hot shower, or hot pack--to relieve pain and stiffness. But don't use heat on a swollen joint. ? Put ice or a cold pack on your sore joint for 10 to 20 minutes at a time. Put a thin cloth between the ice and your skin. When should you call for help? Call your doctor now or seek immediate medical care if:    · You have sudden swelling, warmth, or pain in any joint.     · You have joint pain and a fever or rash.     · You have such bad pain that you cannot use a joint. Watch closely for changes in your health, and be sure to contact your doctor if:    · You have mild joint symptoms that continue even with more than 6 weeks of care at home.     · You have stomach pain or other problems with your medicine. Where can you learn more? Go to http://www.gray.com/  Enter L368 in the search box to learn more about \"Arthritis: Care Instructions. \"  Current as of: December 20, 2021               Content Version: 13.2  © 7893-7655 [employer]. Care instructions adapted under license by [employer] (which disclaims liability or warranty for this information). If you have questions about a medical condition or this instruction, always ask your healthcare professional. [place] [address] any warranty or liability for your use of this information. Low Back Arthritis: Exercises  Introduction  Here are some examples of typical rehabilitation exercises for your condition. Start each exercise slowly. Ease off the exercise if you start to have pain.   Your doctor or physical therapist will tell you when you can start these exercises and which ones will work best for you. When you are not being active, find a comfortable position for rest. Some people are comfortable on the floor or a medium-firm bed with a small pillow under their head and another under their knees. Some people prefer to lie on their side with a pillow between their knees. Don't stay in one position for too long. Take short walks (10 to 20 minutes) every 2 to 3 hours. Avoid slopes, hills, and stairs until you feel better. Walk only distances you can manage without pain, especially leg pain. How to do the exercises  Pelvic tilt    1. Lie on your back with your knees bent. 2. \"Brace\" your stomach--tighten your muscles by pulling in and imagining your belly button moving toward your spine. 3. Press your lower back into the floor. You should feel your hips and pelvis rock back. 4. Hold for 6 seconds while breathing smoothly. 5. Relax and allow your pelvis and hips to rock forward. 6. Repeat 8 to 12 times. Back stretches    1. Get down on your hands and knees on the floor. 2. Relax your head and allow it to droop. Round your back up toward the ceiling until you feel a nice stretch in your upper, middle, and lower back. Hold this stretch for as long as it feels comfortable, or about 15 to 30 seconds. 3. Return to the starting position with a flat back while you are on your hands and knees. 4. Let your back sway by pressing your stomach toward the floor. Lift your buttocks toward the ceiling. 5. Hold this position for 15 to 30 seconds. 6. Repeat 2 to 4 times. Follow-up care is a key part of your treatment and safety. Be sure to make and go to all appointments, and call your doctor if you are having problems. It's also a good idea to know your test results and keep a list of the medicines you take. Where can you learn more? Go to http://www.[employer].com/  Enter T094 in the search box to learn more about \"Low Back Arthritis: Exercises. \"  Current as of: July 1, 2021               Content Version: 13.2  © 2006-2022 [employer]. Care instructions adapted under license by [employer] (which disclaims liability or warranty for this information). If you have questions about a medical condition or this instruction, always ask your healthcare professional. [address] any warranty or liability for your use of this information. Hand Arthritis: Exercises  Introduction  Here are some examples of exercises for you to try. The exercises may be suggested for a condition or for rehabilitation. Start each exercise slowly. Ease off the exercises if you start to have pain. You will be told when to start these exercises and which ones will work best for you. How to do the exercises  Tendon glides    1. In this exercise, the steps follow one another to a make a continuous movement. 2. With your affected hand, point your fingers and thumb straight up. Your wrist should be relaxed, following the line of your fingers and thumb. 3. Curl your fingers so that the top two joints in them are bent, and your fingers wrap down. Your fingertips should touch or be near the base of your fingers. Your fingers will look like a hook. 4. Make a fist by bending your knuckles. Your thumb can gently rest against your index (pointing) finger. 5. Unwind your fingers slightly so that your fingertips can touch the base of your palm. Your thumb can rest against your index finger. 6. Move back to your starting position, with your fingers and thumb pointing up. 7. Repeat the series of motions 8 to 12 times. 8. Switch hands and repeat steps 1 through 6, even if only one hand is sore. Intrinsic flexion    1. Rest your affected hand on a table and bend the large joints where your fingers connect to your hand. Keep your thumb and the other joints in your fingers straight. 2. Slowly straighten your fingers.  Your wrist should be relaxed, following the line of your fingers and thumb. 3. Move back to your starting position, with your hand bent. 4. Repeat 8 to 12 times. 5. Switch hands and repeat steps 1 through 4, even if only one hand is sore. Finger extension    1. Place your affected hand flat on a table. 2. Lift and then lower one finger at a time off the table. 3. Repeat 8 to 12 times. 4. Switch hands and repeat steps 1 through 3, even if only one hand is sore. MP extension    1. Place your good hand on a table, palm up. Put your affected hand on top of your good hand with your fingers wrapped around the thumb of your good hand like you are making a fist.  2. Slowly uncurl the joints of your affected hand where your fingers connect to your hand so that only the top two joints of your fingers are bent. Your fingers will look like a hook. 3. Move back to your starting position, with your fingers wrapped around your good thumb. 4. Repeat 8 to 12 times. 5. Switch hands and repeat steps 1 through 4, even if only one hand is sore. PIP extension (with MP extension)    1. Place your good hand on a table, palm up. Put your affected hand on top of your good hand, palm up. 2. Use the thumb and fingers of your good hand to grasp below the middle joint of one finger of your affected hand. 3. Straighten the last two joints of that finger. 4. Repeat 8 to 12 times. 5. Repeat steps 1 through 4 with each finger. 6. Switch hands and repeat steps 1 through 5, even if only one hand is sore. DIP flexion    1. With your good hand, grasp one finger of your affected hand. Your thumb will be on the top side of your finger just below the joint that is closest to your fingernail. 2. Slowly bend your affected finger only at the joint closest to your fingernail. 3. Repeat 8 to 12 times. 4. Repeat steps 1 through 3 with each finger. 5. Switch hands and repeat steps 1 through 4, even if only one hand is sore.   Follow-up care is a key part of your treatment and safety. Be sure to make and go to all appointments, and call your doctor if you are having problems. It's also a good idea to know your test results and keep a list of the medicines you take. Where can you learn more? Go to http://www.gray.com/  Enter E040 in the search box to learn more about \"Hand Arthritis: Exercises. \"  Current as of: July 1, 2021               Content Version: 13.2  © 2006-2022 [employer]. Care instructions adapted under license by [employer] (which disclaims liability or warranty for this information). If you have questions about a medical condition or this instruction, always ask your healthcare professional. [address] any warranty or liability for your use of this information.

## 2022-04-24 NOTE — PROGRESS NOTES
Subjective:     Alfred Coyle is a 59 y.o. male who presents today with the following:  Chief Complaint   Patient presents with    Cholesterol Problem    LOW BACK PAIN      sees DR Paul       Patient Active Problem List   Diagnosis Code    Arthritis M19.90    Esophagitis determined by endoscopy K20.90    Hyperlipemia E78.5    Diverticulosis large intestine w/o perforation or abscess w/o bleeding K57.30    Obesity, morbid (HCC) E66.01    Benign essential hypertension I10    Lumbar radiculopathy M54.16    Spondylosis of lumbar region without myelopathy or radiculopathy M47.816    Nocturia R35.1    GI bleed K92.2    HTN (hypertension) I10    Tobacco abuse Z72.0         COMPLIANT WITH MEDICATION:   HTN; Denies chest pain, dyspnea, palpitations, headache and blurred vision. Blood pressure elevated. depression screening addressed not at risk    abuse screening addressed denies    learning assessment addressed reviewed nurses notes    fall risk addressed not at risk    HM: addressed    ROS:  Gen: denies fever, chills, fatigue, weight loss, weight gain  HEENT:denies blurry vision, nasal congestion, sore throat  Resp: denies dypsnea, cough, wheezing  CV: denies chest pain radiating to the jaws or arms, palpitations, lower extremity edema  Abd: denies nausea, vomiting, diarrhea, constipation  Neuro: denies numbness/tingling  Endo: denies polyuria, polydipsia, heat/cold intolerance  Heme: no lymphadenopathy    No Known Allergies      Current Outpatient Medications:     DULoxetine (CYMBALTA) 60 mg capsule, Take 1 Capsule by mouth two (2) times a day. Indications: chronic muscle or bone pain, neuropathic pain, Disp: 180 Capsule, Rfl: 1    ketorolac (TORADOL) 10 mg tablet, Take 1 Tablet by mouth every six (6) hours as needed for Pain. Indications: excessive pain, Disp: 20 Tablet, Rfl: 0    colchicine (Colcrys) 0.6 mg tablet, Take 2 tablets intially and then 1 tablet 1 hour later.   Max # 3 tablets Indications: acute inflammation of the joints due to gout attack, Disp: 28 Tablet, Rfl: 3    hydroCHLOROthiazide (HYDRODIURIL) 25 mg tablet, TAKE 1 TABLET DAILY FOR    HIGH BLOOD PRESSURE, Disp: 90 Tablet, Rfl: 3    atorvastatin (LIPITOR) 40 mg tablet, Take 1 Tablet by mouth daily. , Disp: 90 Tablet, Rfl: 3    enalapril (VASOTEC) 20 mg tablet, TAKE 1 TABLET DAILY, Disp: 90 Tablet, Rfl: 3    dilTIAZem ER (CARDIZEM CD) 300 mg capsule, TAKE 1 CAPSULE DAILY FOR   HIGH BLOOD PRESSURE, Disp: 90 Capsule, Rfl: 3    amitriptyline (ELAVIL) 50 mg tablet, TAKE 1 TABLET NIGHTLY  Indications: neuropathic pain, Disp: 90 Tablet, Rfl: 0    Past Medical History:   Diagnosis Date    Arthritis     Diverticulosis     Enureses     Gout     Hypercholesterolemia     Hypertension     Sciatica        Past Surgical History:   Procedure Laterality Date    HX COLONOSCOPY  022011    HX COLONOSCOPY  8/2015    polyp x1       Social History     Tobacco Use   Smoking Status Light Tobacco Smoker    Types: Cigarettes   Smokeless Tobacco Never Used   Tobacco Comment    occassionally       Social History     Socioeconomic History    Marital status:    Tobacco Use    Smoking status: Light Tobacco Smoker     Types: Cigarettes    Smokeless tobacco: Never Used    Tobacco comment: occassionally   Vaping Use    Vaping Use: Never used   Substance and Sexual Activity    Alcohol use:  Yes    Drug use: No   Other Topics Concern     Service No    Blood Transfusions No    Caffeine Concern No    Occupational Exposure No    Hobby Hazards No    Sleep Concern No    Stress Concern No    Weight Concern Yes    Special Diet No    Back Care Yes     Comment: back Lower Lumbar problems    Exercise Yes    Bike Helmet No    Seat Belt Yes    Self-Exams Yes       Family History   Problem Relation Age of Onset    Diabetes Mother     Heart Disease Mother     Cancer Father         prostate         Objective:     Visit Vitals  BP (!) 152/100 (BP 1 Location: Left upper arm, BP Patient Position: Sitting, BP Cuff Size: Large adult)   Pulse 88   Temp 97.5 °F (36.4 °C) (Temporal)   Ht 5' 10\" (1.778 m)   Wt 274 lb (124.3 kg)   SpO2 97%   BMI 39.31 kg/m²     Body mass index is 39.31 kg/m². General: Alert and oriented. No acute distress. Well nourished  HEENT :  Ears:TMs are normal. Canals are clear. Eyes: pupils equal, round, react to light and accommodation. Extra ocular movements intact. Nose: patent. Mouth and throat is clear. Neck:supple full range of motion no thyromegaly. Trachea midline, No carotid bruits. No significant lymphadenopathy  Lungs[de-identified] clear to auscultation without wheezes, rales, or rhonchi. Heart :RRR, S1 & S2 are normal intensity. No murmur; no gallop  Abdomen: bowel sounds active. No tenderness, guarding, rebound, masses, hepatic or spleen enlargement  Back: no CVA tenderness. Low back discomfort with left side sciatica   Extremities: without clubbing, cyanosis, or edema  Pulses: radial and femoral pulses are normal  Neuro: HMF intact. Cranial nerves II through XII grossly normal.  Motor: is 5 over 5 and symmetrical.   Deep tendon reflexes: +2 equal  Psych:appropriate behavior, mood, affect and judgement. Results for orders placed or performed in visit on 30/85/65   METABOLIC PANEL, COMPREHENSIVE   Result Value Ref Range    Sodium 135 (L) 136 - 145 mmol/L    Potassium 4.6 3.5 - 5.1 mmol/L    Chloride 103 97 - 108 mmol/L    CO2 28 21 - 32 mmol/L    Anion gap 4 (L) 5 - 15 mmol/L    Glucose 158 (H) 65 - 100 mg/dL    BUN 8 6 - 20 MG/DL    Creatinine 1.26 0.70 - 1.30 MG/DL    BUN/Creatinine ratio 6 (L) 12 - 20      GFR est AA >60 >60 ml/min/1.73m2    GFR est non-AA 58 (L) >60 ml/min/1.73m2    Calcium 9.6 8.5 - 10.1 MG/DL    Bilirubin, total 0.4 0.2 - 1.0 MG/DL    ALT (SGPT) 36 12 - 78 U/L    AST (SGOT) 21 15 - 37 U/L    Alk.  phosphatase 100 45 - 117 U/L    Protein, total 7.8 6.4 - 8.2 g/dL    Albumin 4.2 3.5 - 5.0 g/dL Globulin 3.6 2.0 - 4.0 g/dL    A-G Ratio 1.2 1.1 - 2.2     LIPID PANEL   Result Value Ref Range    Cholesterol, total 139 <200 MG/DL    Triglyceride 88 <150 MG/DL    HDL Cholesterol 57 MG/DL    LDL, calculated 64.4 0 - 100 MG/DL    VLDL, calculated 17.6 MG/DL    CHOL/HDL Ratio 2.4 0.0 - 5.0     CBC WITH AUTOMATED DIFF   Result Value Ref Range    WBC 9.7 4.1 - 11.1 K/uL    RBC 4.64 4.10 - 5.70 M/uL    HGB 11.7 (L) 12.1 - 17.0 g/dL    HCT 40.5 36.6 - 50.3 %    MCV 87.3 80.0 - 99.0 FL    MCH 25.2 (L) 26.0 - 34.0 PG    MCHC 28.9 (L) 30.0 - 36.5 g/dL    RDW 14.0 11.5 - 14.5 %    PLATELET 783 070 - 485 K/uL    MPV 9.7 8.9 - 12.9 FL    NRBC 0.0 0  WBC    ABSOLUTE NRBC 0.00 0.00 - 0.01 K/uL    NEUTROPHILS 63 32 - 75 %    LYMPHOCYTES 27 12 - 49 %    MONOCYTES 8 5 - 13 %    EOSINOPHILS 1 0 - 7 %    BASOPHILS 1 0 - 1 %    IMMATURE GRANULOCYTES 0 0.0 - 0.5 %    ABS. NEUTROPHILS 6.1 1.8 - 8.0 K/UL    ABS. LYMPHOCYTES 2.6 0.8 - 3.5 K/UL    ABS. MONOCYTES 0.8 0.0 - 1.0 K/UL    ABS. EOSINOPHILS 0.1 0.0 - 0.4 K/UL    ABS. BASOPHILS 0.1 0.0 - 0.1 K/UL    ABS. IMM. GRANS. 0.0 0.00 - 0.04 K/UL    DF SMEAR SCANNED      RBC COMMENTS HYPOCHROMIA  1+           Results for orders placed or performed in visit on 32/74/97   METABOLIC PANEL, COMPREHENSIVE   Result Value Ref Range    Sodium 135 (L) 136 - 145 mmol/L    Potassium 4.6 3.5 - 5.1 mmol/L    Chloride 103 97 - 108 mmol/L    CO2 28 21 - 32 mmol/L    Anion gap 4 (L) 5 - 15 mmol/L    Glucose 158 (H) 65 - 100 mg/dL    BUN 8 6 - 20 MG/DL    Creatinine 1.26 0.70 - 1.30 MG/DL    BUN/Creatinine ratio 6 (L) 12 - 20      GFR est AA >60 >60 ml/min/1.73m2    GFR est non-AA 58 (L) >60 ml/min/1.73m2    Calcium 9.6 8.5 - 10.1 MG/DL    Bilirubin, total 0.4 0.2 - 1.0 MG/DL    ALT (SGPT) 36 12 - 78 U/L    AST (SGOT) 21 15 - 37 U/L    Alk.  phosphatase 100 45 - 117 U/L    Protein, total 7.8 6.4 - 8.2 g/dL    Albumin 4.2 3.5 - 5.0 g/dL    Globulin 3.6 2.0 - 4.0 g/dL    A-G Ratio 1.2 1.1 - 2.2 LIPID PANEL   Result Value Ref Range    Cholesterol, total 139 <200 MG/DL    Triglyceride 88 <150 MG/DL    HDL Cholesterol 57 MG/DL    LDL, calculated 64.4 0 - 100 MG/DL    VLDL, calculated 17.6 MG/DL    CHOL/HDL Ratio 2.4 0.0 - 5.0     CBC WITH AUTOMATED DIFF   Result Value Ref Range    WBC 9.7 4.1 - 11.1 K/uL    RBC 4.64 4.10 - 5.70 M/uL    HGB 11.7 (L) 12.1 - 17.0 g/dL    HCT 40.5 36.6 - 50.3 %    MCV 87.3 80.0 - 99.0 FL    MCH 25.2 (L) 26.0 - 34.0 PG    MCHC 28.9 (L) 30.0 - 36.5 g/dL    RDW 14.0 11.5 - 14.5 %    PLATELET 576 089 - 880 K/uL    MPV 9.7 8.9 - 12.9 FL    NRBC 0.0 0  WBC    ABSOLUTE NRBC 0.00 0.00 - 0.01 K/uL    NEUTROPHILS 63 32 - 75 %    LYMPHOCYTES 27 12 - 49 %    MONOCYTES 8 5 - 13 %    EOSINOPHILS 1 0 - 7 %    BASOPHILS 1 0 - 1 %    IMMATURE GRANULOCYTES 0 0.0 - 0.5 %    ABS. NEUTROPHILS 6.1 1.8 - 8.0 K/UL    ABS. LYMPHOCYTES 2.6 0.8 - 3.5 K/UL    ABS. MONOCYTES 0.8 0.0 - 1.0 K/UL    ABS. EOSINOPHILS 0.1 0.0 - 0.4 K/UL    ABS. BASOPHILS 0.1 0.0 - 0.1 K/UL    ABS. IMM. GRANS. 0.0 0.00 - 0.04 K/UL    DF SMEAR SCANNED      RBC COMMENTS HYPOCHROMIA  1+           Assessment/ Plan:     1. Arthritis    No change in medical management   Exercises provided     2. Nocturia  Followed by Dr. Cyndy Poole . Hx of elevated PSA     3. Sciatica of left side  Ongoing problem followed by Dr. Phillip Gama. ortho    4. Hyperlipidemia, unspecified hyperlipidemia type  BP elevated Back discomfort   - CBC WITH AUTOMATED DIFF; Future  - LIPID PANEL; Future  - METABOLIC PANEL, COMPREHENSIVE;  Future  - COLLECTION VENOUS BLOOD,VENIPUNCTURE; Future  - COLLECTION VENOUS BLOOD,VENIPUNCTURE  - METABOLIC PANEL, COMPREHENSIVE  - LIPID PANEL  - CBC WITH AUTOMATED DIFF      Orders Placed This Encounter    COLLECTION VENOUS BLOOD,VENIPUNCTURE     Standing Status:   Future     Number of Occurrences:   1     Standing Expiration Date:   5/21/2022    CBC WITH AUTOMATED DIFF     Standing Status:   Future     Number of Occurrences:   1     Standing Expiration Date:   5/21/2022    LIPID PANEL     Standing Status:   Future     Number of Occurrences:   1     Standing Expiration Date:   5/00/9917    METABOLIC PANEL, COMPREHENSIVE     Standing Status:   Future     Number of Occurrences:   1     Standing Expiration Date:   5/21/2022    DULoxetine (CYMBALTA) 60 mg capsule     Sig: Take 1 Capsule by mouth two (2) times a day. Indications: chronic muscle or bone pain, neuropathic pain     Dispense:  180 Capsule     Refill:  1     RESTART cymbalta  Does not need a refill at this time.  ketorolac (TORADOL) injection 30 mg    ketorolac (TORADOL) 10 mg tablet     Sig: Take 1 Tablet by mouth every six (6) hours as needed for Pain. Indications: excessive pain     Dispense:  20 Tablet     Refill:  0         Verbal and written instructions (see AVS) provided. Patient expresses understanding of diagnosis and treatment plan.     Health Maintenance Due   Topic Date Due    COVID-19 Vaccine (1) Never done    Colorectal Cancer Screening Combo  01/22/2019    Medicare Yearly Exam  04/21/2022               PABLO Flores

## 2022-04-25 ENCOUNTER — TELEPHONE (OUTPATIENT)
Dept: FAMILY MEDICINE CLINIC | Age: 62
End: 2022-04-25

## 2022-04-25 NOTE — TELEPHONE ENCOUNTER
Patient does not see a urologist, PSA added to order.  He will get batterys for his BP monitor and check his blood pressure and call us with this

## 2022-04-25 NOTE — TELEPHONE ENCOUNTER
----- Message from Velma Pope NP sent at 4/24/2022 12:03 PM EDT -----  Please ask patient to check BP at home and call in BP readings. He had back Pain with sciatica during time of the visit. Thanks!  DL

## 2022-05-04 ENCOUNTER — TELEPHONE (OUTPATIENT)
Dept: FAMILY MEDICINE CLINIC | Age: 62
End: 2022-05-04

## 2022-05-04 RX ORDER — KETOROLAC TROMETHAMINE 10 MG/1
10 TABLET, FILM COATED ORAL
Qty: 20 TABLET | Refills: 0 | Status: SHIPPED | OUTPATIENT
Start: 2022-05-04 | End: 2022-06-20

## 2022-05-04 RX ORDER — DULOXETIN HYDROCHLORIDE 60 MG/1
60 CAPSULE, DELAYED RELEASE ORAL 2 TIMES DAILY
Qty: 180 CAPSULE | Refills: 1 | Status: SHIPPED | OUTPATIENT
Start: 2022-05-04

## 2022-06-20 ENCOUNTER — VIRTUAL VISIT (OUTPATIENT)
Dept: FAMILY MEDICINE CLINIC | Age: 62
End: 2022-06-20
Payer: MEDICARE

## 2022-06-20 DIAGNOSIS — Z00.00 MEDICARE ANNUAL WELLNESS VISIT, SUBSEQUENT: Primary | ICD-10-CM

## 2022-06-20 DIAGNOSIS — G89.29 CHRONIC LEFT-SIDED LOW BACK PAIN WITHOUT SCIATICA: ICD-10-CM

## 2022-06-20 DIAGNOSIS — M54.50 CHRONIC LEFT-SIDED LOW BACK PAIN WITHOUT SCIATICA: ICD-10-CM

## 2022-06-20 PROCEDURE — G8427 DOCREV CUR MEDS BY ELIG CLIN: HCPCS | Performed by: NURSE PRACTITIONER

## 2022-06-20 PROCEDURE — G8432 DEP SCR NOT DOC, RNG: HCPCS | Performed by: NURSE PRACTITIONER

## 2022-06-20 PROCEDURE — G0439 PPPS, SUBSEQ VISIT: HCPCS | Performed by: NURSE PRACTITIONER

## 2022-06-20 PROCEDURE — G8417 CALC BMI ABV UP PARAM F/U: HCPCS | Performed by: NURSE PRACTITIONER

## 2022-06-20 PROCEDURE — 3017F COLORECTAL CA SCREEN DOC REV: CPT | Performed by: NURSE PRACTITIONER

## 2022-06-20 PROCEDURE — G8756 NO BP MEASURE DOC: HCPCS | Performed by: NURSE PRACTITIONER

## 2022-06-20 RX ORDER — MELOXICAM 15 MG/1
15 TABLET ORAL DAILY
Qty: 30 TABLET | Refills: 0 | Status: SHIPPED | OUTPATIENT
Start: 2022-06-20 | End: 2022-07-28 | Stop reason: SDUPTHER

## 2022-06-20 NOTE — PROGRESS NOTES
Chief Complaint   Patient presents with    Annual Wellness Visit    Back Pain     left     This is the Subsequent Medicare Annual Wellness Exam, performed 12 months or more after the Initial AWV or the last Subsequent AWV    I have reviewed the patient's medical history in detail and updated the computerized patient record. Low Back pian left -chronic ongoing concern. Mr. Bustamante endorses that he tried his wife's Mobic with excellent relief. We discusses the importance of not taking medication that is not prescribed to you. He verbalizes an understanding. Assessment/Plan   Education and counseling provided:  Are appropriate based on today's review and evaluation    1. Medicare annual wellness visit, subsequent    2.  Chronic left-sided low back pain without sciatica   Mobic 15 mg po daily     Depression Risk Factor Screening     3 most recent PHQ Screens 6/20/2022   Little interest or pleasure in doing things Not at all   Feeling down, depressed, irritable, or hopeless Not at all   Total Score PHQ 2 0   Trouble falling or staying asleep, or sleeping too much -   Feeling tired or having little energy -   Poor appetite, weight loss, or overeating -   Feeling bad about yourself - or that you are a failure or have let yourself or your family down -   Trouble concentrating on things such as school, work, reading, or watching TV -   Moving or speaking so slowly that other people could have noticed; or the opposite being so fidgety that others notice -   Thoughts of being better off dead, or hurting yourself in some way -   PHQ 9 Score -   How difficult have these problems made it for you to do your work, take care of your home and get along with others -       Alcohol & Drug Abuse Risk Screen    Do you average more than 2 drinks per night or 14 drinks a week: No    On any one occasion in the past three months have you have had more than 4 drinks containing alcohol:  No          Functional Ability and Level of Safety    Hearing: Hearing is good. Activities of Daily Living: The home contains: handrails  Patient does total self care      Ambulation: with mild difficulty     Fall Risk:  No flowsheet data found. Abuse Screen:  Patient is not abused       Cognitive Screening    Has your family/caregiver stated any concerns about your memory: no     Cognitive Screening: Normal - MMSE (Mini Mental Status Exam)    Health Maintenance Due     Health Maintenance Due   Topic Date Due    COVID-19 Vaccine (1) Never done    Colorectal Cancer Screening Combo  01/22/2019       Patient Care Team   Patient Care Team:  Dimitry Garcia NP as PCP - General (Nurse Practitioner)  Dimitry Garcia NP as PCP [facility]  Arianna Messer MD (Inactive) (General Surgery)    History     Patient Active Problem List   Diagnosis Code    Arthritis M19.90    Esophagitis determined by endoscopy K20.90    Hyperlipemia E78.5    Diverticulosis large intestine w/o perforation or abscess w/o bleeding K57.30    Obesity, morbid (HCC) E66.01    Benign essential hypertension I10    Lumbar radiculopathy M54.16    Spondylosis of lumbar region without myelopathy or radiculopathy M47.816    Nocturia R35.1    GI bleed K92.2    HTN (hypertension) I10    Tobacco abuse Z72.0     Past Medical History:   Diagnosis Date    Arthritis     Diverticulosis     Enureses     Gout     Hypercholesterolemia     Hypertension     Nocturia     Sciatica       Past Surgical History:   Procedure Laterality Date    HX COLONOSCOPY  022011    HX COLONOSCOPY  8/2015    polyp x1     Current Outpatient Medications   Medication Sig Dispense Refill    meloxicam (MOBIC) 15 mg tablet Take 1 Tablet by mouth daily. 30 Tablet 0    DULoxetine (CYMBALTA) 60 mg capsule Take 1 Capsule by mouth two (2) times a day.  Indications: chronic muscle or bone pain, neuropathic pain 180 Capsule 1    colchicine (Colcrys) 0.6 mg tablet Take 2 tablets intially and then 1 tablet 1 hour later. Max # 3 tablets  Indications: acute inflammation of the joints due to gout attack 28 Tablet 3    hydroCHLOROthiazide (HYDRODIURIL) 25 mg tablet TAKE 1 TABLET DAILY FOR    HIGH BLOOD PRESSURE 90 Tablet 3    atorvastatin (LIPITOR) 40 mg tablet Take 1 Tablet by mouth daily. 90 Tablet 3    enalapril (VASOTEC) 20 mg tablet TAKE 1 TABLET DAILY 90 Tablet 3    dilTIAZem ER (CARDIZEM CD) 300 mg capsule TAKE 1 CAPSULE DAILY FOR   HIGH BLOOD PRESSURE 90 Capsule 3    amitriptyline (ELAVIL) 50 mg tablet TAKE 1 TABLET NIGHTLY  Indications: neuropathic pain 90 Tablet 0     No Known Allergies    Family History   Problem Relation Age of Onset    Diabetes Mother     Heart Disease Mother     Cancer Father         prostate     Social History     Tobacco Use    Smoking status: Light Tobacco Smoker     Types: Cigarettes    Smokeless tobacco: Never Used    Tobacco comment: occassionally   Substance Use Topics    Alcohol use: Yes         Amber Fu NP-C   1. \"Have you been to the ER, urgent care clinic since your last visit? Hospitalized since your last visit? \"  Yes 2 in last month at [address] [place] [address] for back and left hip pain    2. \"Have you seen or consulted any other health care providers outside of the [address] [place] [address] since your last visit? \" no     3. For patients aged 39-70: Has the patient had a colonoscopy / FIT/ Cologuard? Yes Care Gap present      If the patient is female:    4. For patients aged 41-77: Has the patient had a mammogram within the past 2 years? NA      5. For patients aged 21-65: Has the patient ha.abuse  d a pap smear?  NA

## 2022-06-23 NOTE — ACP (ADVANCE CARE PLANNING)
Discussed importance of advanced medical directives with patient. Patient is capable of making decisions.   Rochelle Holden NP-C

## 2022-06-23 NOTE — PATIENT INSTRUCTIONS
Medicare Wellness Visit, Male    The best way to live healthy is to have a lifestyle where you eat a well-balanced diet, exercise regularly, limit alcohol use, and quit all forms of tobacco/nicotine, if applicable. Regular preventive services are another way to keep healthy. Preventive services (vaccines, screening tests, monitoring & exams) can help personalize your care plan, which helps you manage your own care. Screening tests can find health problems at the earliest stages, when they are easiest to treat. [facility] follows the current, evidence-based guidelines published by the [facility] ([facility]) when recommending preventive services for our patients. Because we follow these guidelines, sometimes recommendations change over time as research supports it. (For example, a prostate screening blood test is no longer routinely recommended for men with no symptoms). Of course, you and your doctor may decide to screen more often for some diseases, based on your risk and co-morbidities (chronic disease you are already diagnosed with). Preventive services for you include:  - Medicare offers their members a free annual wellness visit, which is time for you and your primary care provider to discuss and plan for your preventive service needs. Take advantage of this benefit every year!  -All adults over age 72 should receive the recommended pneumonia vaccines. Current USPSTF guidelines recommend a series of two vaccines for the best pneumonia protection.   -All adults should have a flu vaccine yearly and tetanus vaccine every 10 years.  -All adults age 48 and older should receive the shingles vaccines (series of two vaccines).        -All adults age 38-68 who are overweight should have a diabetes screening test once every three years.   -Other screening tests & preventive services for persons with diabetes include: an eye exam to screen for diabetic retinopathy, a kidney function test, a foot exam, and stricter control over your cholesterol.   -Cardiovascular screening for adults with routine risk involves an electrocardiogram (ECG) at intervals determined by the provider.   -Colorectal cancer screening should be done for adults age 54-65 with no increased risk factors for colorectal cancer. There are a number of acceptable methods of screening for this type of cancer. Each test has its own benefits and drawbacks. Discuss with your provider what is most appropriate for you during your annual wellness visit. The different tests include: colonoscopy (considered the best screening method), a fecal occult blood test, a fecal DNA test, and sigmoidoscopy.  -All adults born between [facility] should be screened once for Hepatitis C.  -An Abdominal Aortic Aneurysm (AAA) Screening is recommended for men age 73-68 who has ever smoked in their lifetime.      Here is a list of your current Health Maintenance items (your personalized list of preventive services) with a due date:  Health Maintenance Due   Topic Date Due    COVID-19 Vaccine (1) Never done    Colorectal Screening  01/22/2019

## 2022-07-29 RX ORDER — MELOXICAM 15 MG/1
15 TABLET ORAL DAILY
Qty: 30 TABLET | Refills: 0 | Status: SHIPPED | OUTPATIENT
Start: 2022-07-29 | End: 2022-08-02 | Stop reason: SDUPTHER

## 2022-07-29 NOTE — TELEPHONE ENCOUNTER
It does not look like Jennifer Artist saw this and Naveed Scott is completely out of his medication. He would like for Lilli Sanabria to see if it can be approved and for the 90 day supply. He uses [facility].

## 2022-08-02 ENCOUNTER — VIRTUAL VISIT (OUTPATIENT)
Dept: FAMILY MEDICINE CLINIC | Age: 62
End: 2022-08-02
Payer: MEDICARE

## 2022-08-02 DIAGNOSIS — M54.16 LUMBAR RADICULOPATHY: ICD-10-CM

## 2022-08-02 DIAGNOSIS — Z76.89 ENCOUNTER FOR WEIGHT MANAGEMENT: Primary | ICD-10-CM

## 2022-08-02 DIAGNOSIS — M47.816 SPONDYLOSIS OF LUMBAR REGION WITHOUT MYELOPATHY OR RADICULOPATHY: ICD-10-CM

## 2022-08-02 PROCEDURE — 99443 PR PHYS/QHP TELEPHONE EVALUATION 21-30 MIN: CPT | Performed by: NURSE PRACTITIONER

## 2022-08-02 RX ORDER — MELOXICAM 15 MG/1
15 TABLET ORAL DAILY
Qty: 90 TABLET | Refills: 1 | Status: SHIPPED | OUTPATIENT
Start: 2022-08-02

## 2022-08-02 NOTE — PROGRESS NOTES
Chief Complaint   Patient presents with    Weight Management     1. \"Have you been to the ER, urgent care clinic since your last visit? Hospitalized since your last visit? \" No    2. \"Have you seen or consulted any other health care providers outside of the [address] [place] [address] since your last visit? \" No     3. For patients aged 39-70: Has the patient had a colonoscopy / FIT/ Cologuard? NA - based on age      If the patient is female:    4. For patients aged 41-77: Has the patient had a mammogram within the past 2 years? Yes - Care Gap present. Most recent result on file      5. For patients aged 21-65: Has the patient had a pap smear?  NA - based on age or sex

## 2022-08-03 RX ORDER — NALTREXONE HYDROCHLORIDE 50 MG/1
TABLET, FILM COATED ORAL
Qty: 90 TABLET | Refills: 0 | Status: SHIPPED | OUTPATIENT
Start: 2022-08-03

## 2022-08-03 RX ORDER — BUPROPION HYDROCHLORIDE 100 MG/1
TABLET ORAL
Qty: 90 TABLET | Refills: 3 | Status: SHIPPED | OUTPATIENT
Start: 2022-08-03

## 2022-08-04 NOTE — PROGRESS NOTES
Darrin Londono is a 57 y.o. male, evaluated via audio-only technology on 8/2/2022 for Weight Management  . Mr. Brianda Mcdaniel endorses he was asked to loose weight prior to back surgery. We previously discussed weight loss interventions. He has decided to try Contrave components with his wife. Discussed the patient's BMI with him. The BMI follow up plan is as follows:     dietary management education, guidance, and counseling  encourage exercise  monitor weight  prescribed dietary intake          Assessment & Plan:   Diagnoses and all orders for this visit:    1. Encounter for weight management    2. Spondylosis of lumbar region without myelopathy or radiculopathy    3. Lumbar radiculopathy    Other orders  -     meloxicam (MOBIC) 15 mg tablet; Take 1 Tablet by mouth in the morning.  -     buPROPion (WELLBUTRIN) 100 mg tablet; Week 1 take 1 tab in AM-  Week 2 1 tab in the am and 1 tab in the pm- Week 3 -2 tabs in the am  1 tab in the pm -Week 4  2 tabs in the am and 2 tabs in the pm  Indications: Weight loss  -     naltrexone (DEPADE) 50 mg tablet; Tke 1/4 tab in the am for 2 weeks , then increase to 1/2 tab twice a day    The complexity of medical decision making for this visit is moderate       12  Subjective:       Prior to Admission medications    Medication Sig Start Date End Date Taking? Authorizing Provider   buPROPion (WELLBUTRIN) 100 mg tablet Week 1 take 1 tab in AM-  Week 2 1 tab in the am and 1 tab in the pm- Week 3 -2 tabs in the am  1 tab in the pm -Week 4  2 tabs in the am and 2 tabs in the pm  Indications: Weight loss 8/3/22  Yes Tucumcari Snooks, NP   naltrexone (DEPADE) 50 mg tablet Tke 1/4 tab in the am for 2 weeks , then increase to 1/2 tab twice a day 8/3/22  Yes Andrew Snooks, NP   meloxicam (MOBIC) 15 mg tablet Take 1 Tablet by mouth in the morning. 8/2/22  Yes Tucumcari Snooks, NP   DULoxetine (CYMBALTA) 60 mg capsule Take 1 Capsule by mouth two (2) times a day.  Indications: chronic muscle or bone pain, neuropathic pain 5/4/22  Yes Yash Sanderson NP   colchicine (Colcrys) 0.6 mg tablet Take 2 tablets intially and then 1 tablet 1 hour later. Max # 3 tablets  Indications: acute inflammation of the joints due to gout attack 1/31/22  Yes Yash Sanderson, NP   hydroCHLOROthiazide (HYDRODIURIL) 25 mg tablet TAKE 1 TABLET DAILY FOR    HIGH BLOOD PRESSURE 1/11/22  Yes Yash Sanderson NP   atorvastatin (LIPITOR) 40 mg tablet Take 1 Tablet by mouth daily. 1/11/22  Yes Yash Sanderson, NP   enalapril (VASOTEC) 20 mg tablet TAKE 1 TABLET DAILY 1/11/22  Yes Yash Sanderson NP   dilTIAZem ER (CARDIZEM CD) 300 mg capsule TAKE 1 CAPSULE DAILY FOR   HIGH BLOOD PRESSURE 1/11/22  Yes Yash Sanderson NP   amitriptyline (ELAVIL) 50 mg tablet TAKE 1 TABLET NIGHTLY  Indications: neuropathic pain 11/12/21  Yes Yash Sanderson NP     Patient Active Problem List   Diagnosis Code    Arthritis M19.90    Esophagitis determined by endoscopy K20.90    Hyperlipemia E78.5    Diverticulosis large intestine w/o perforation or abscess w/o bleeding K57.30    Obesity, morbid (HCC) E66.01    Benign essential hypertension I10    Lumbar radiculopathy M54.16    Spondylosis of lumbar region without myelopathy or radiculopathy M47.816    Nocturia R35.1    GI bleed K92.2    HTN (hypertension) I10    Tobacco abuse Z72.0     Current Outpatient Medications   Medication Sig Dispense Refill    buPROPion (WELLBUTRIN) 100 mg tablet Week 1 take 1 tab in AM-  Week 2 1 tab in the am and 1 tab in the pm- Week 3 -2 tabs in the am  1 tab in the pm -Week 4  2 tabs in the am and 2 tabs in the pm  Indications: Weight loss 90 Tablet 3    naltrexone (DEPADE) 50 mg tablet Tke 1/4 tab in the am for 2 weeks , then increase to 1/2 tab twice a day 90 Tablet 0    meloxicam (MOBIC) 15 mg tablet Take 1 Tablet by mouth in the morning. 90 Tablet 1    DULoxetine (CYMBALTA) 60 mg capsule Take 1 Capsule by mouth two (2) times a day.  Indications: chronic muscle or bone pain, neuropathic pain 180 Capsule 1    colchicine (Colcrys) 0.6 mg tablet Take 2 tablets intially and then 1 tablet 1 hour later. Max # 3 tablets  Indications: acute inflammation of the joints due to gout attack 28 Tablet 3    hydroCHLOROthiazide (HYDRODIURIL) 25 mg tablet TAKE 1 TABLET DAILY FOR    HIGH BLOOD PRESSURE 90 Tablet 3    atorvastatin (LIPITOR) 40 mg tablet Take 1 Tablet by mouth daily. 90 Tablet 3    enalapril (VASOTEC) 20 mg tablet TAKE 1 TABLET DAILY 90 Tablet 3    dilTIAZem ER (CARDIZEM CD) 300 mg capsule TAKE 1 CAPSULE DAILY FOR   HIGH BLOOD PRESSURE 90 Capsule 3    amitriptyline (ELAVIL) 50 mg tablet TAKE 1 TABLET NIGHTLY  Indications: neuropathic pain 90 Tablet 0     No Known Allergies  Past Medical History:   Diagnosis Date    Arthritis     Diverticulosis     Enureses     Gout     Hypercholesterolemia     Hypertension     Nocturia     Sciatica      Past Surgical History:   Procedure Laterality Date    HX COLONOSCOPY  022011    HX COLONOSCOPY  8/2015    polyp x1     Family History   Problem Relation Age of Onset    Diabetes Mother     Heart Disease Mother     Cancer Father         prostate     Social History     Tobacco Use    Smoking status: Light Smoker     Types: Cigarettes    Smokeless tobacco: Never    Tobacco comments:     occassionally   Substance Use Topics    Alcohol use: Yes       ROS  Pertinent items are noted on the HPI  No data recorded     Marlen Joseph Sr was evaluated through a patient-initiated, synchronous (real-time) audio only encounter. He (or guardian if applicable) is aware that it is a billable service, which includes applicable co-pays, with coverage as determined by his insurance carrier. This visit was conducted with the patient's (and/or Hemphill Grandchild guardian's) verbal consent. He has not had a related appointment within my department in the past 7 days or scheduled within the next 24 hours. The patient was located in a state where the provider was licensed to provide care.   The patient was located at: Home: [address] [place] [address]  The provider was located at:  Facility (Appt Department): [address] [id number]    Total Time: minutes: 21-30 minutes    Dori Sebastian NP

## 2022-10-12 ENCOUNTER — TELEPHONE (OUTPATIENT)
Dept: FAMILY MEDICINE CLINIC | Age: 62
End: 2022-10-12

## 2022-10-12 NOTE — TELEPHONE ENCOUNTER
Leia Holley is calling to see if you ever received the stool sample from [facility] and if so are the results ready. Please advise.

## 2022-10-12 NOTE — TELEPHONE ENCOUNTER
Juan Rowe is calling back about her 's stool results. Both Mary and Karyn do not see any results. In his wife's chart, Juan Rowe, there are results for a stool culture. Please verify that these are the results for her and not Narcisa Zuniga.

## 2022-10-12 NOTE — TELEPHONE ENCOUNTER
S/w Kelley Villagomez and Ashlie Palomo, they report his occult test was negative he got thru his insurance and will bring a copy when they come in next time.

## 2022-10-18 RX ORDER — HYDROCHLOROTHIAZIDE 25 MG/1
TABLET ORAL
Qty: 90 TABLET | Refills: 3 | Status: SHIPPED | OUTPATIENT
Start: 2022-10-18

## 2022-10-18 RX ORDER — ATORVASTATIN CALCIUM 40 MG/1
TABLET, FILM COATED ORAL
Qty: 90 TABLET | Refills: 3 | Status: SHIPPED | OUTPATIENT
Start: 2022-10-18

## 2022-10-18 RX ORDER — ENALAPRIL MALEATE 20 MG/1
TABLET ORAL
Qty: 90 TABLET | Refills: 3 | Status: SHIPPED | OUTPATIENT
Start: 2022-10-18

## 2022-10-18 RX ORDER — DILTIAZEM HYDROCHLORIDE 300 MG/1
CAPSULE, COATED, EXTENDED RELEASE ORAL
Qty: 90 CAPSULE | Refills: 3 | Status: SHIPPED | OUTPATIENT
Start: 2022-10-18

## 2022-10-18 NOTE — TELEPHONE ENCOUNTER
Message routed to provider to approve refill.     Requested Prescriptions     Pending Prescriptions Disp Refills    hydroCHLOROthiazide (HYDRODIURIL) 25 mg tablet 90 Tablet 3     Sig: TAKE 1 TABLET DAILY FOR    HIGH BLOOD PRESSURE    dilTIAZem ER (CARDIZEM CD) 300 mg capsule 90 Capsule 3     Sig: TAKE 1 CAPSULE DAILY FOR   HIGH BLOOD PRESSURE    enalapril (VASOTEC) 20 mg tablet 90 Tablet 3     Sig: TAKE 1 TABLET DAILY

## 2022-10-18 NOTE — TELEPHONE ENCOUNTER
Pt is requesting refills on hydrochlorothiazide 25 mg, diltiazem  mg, enalapril 20 mg. He uses [facility].

## 2023-01-03 ENCOUNTER — VIRTUAL VISIT (OUTPATIENT)
Dept: FAMILY MEDICINE CLINIC | Age: 63
End: 2023-01-03
Payer: MEDICARE

## 2023-01-03 DIAGNOSIS — G89.29 CHRONIC LEFT SHOULDER PAIN: Primary | ICD-10-CM

## 2023-01-03 DIAGNOSIS — M25.512 CHRONIC LEFT SHOULDER PAIN: Primary | ICD-10-CM

## 2023-01-03 DIAGNOSIS — M79.671 RIGHT FOOT PAIN: ICD-10-CM

## 2023-01-03 PROCEDURE — 99443 PR PHYS/QHP TELEPHONE EVALUATION 21-30 MIN: CPT | Performed by: NURSE PRACTITIONER

## 2023-01-03 NOTE — PROGRESS NOTES
Chief Complaint   Patient presents with    Shoulder Pain     left    Foot Pain     right   Mr. Vidya Mendoza endorses he has had surgery on his left elbow, Since that time  he developed a soreness in his left shoulder   He is being followed by orthopedic and would like an xray of both areas. He declines any further interventions at this time and will follow up with ortho if needed. Paolo Vazquez He also endorses right heel pain and requests an xray. Both shoulder and foot pain onset a few weeks. 1. \"Have you been to the ER, urgent care clinic since your last visit? Hospitalized since your last visit? \" No    2. \"Have you seen or consulted any other health care providers outside of the [address] [place] [address] since your last visit? \" No     3. For patients aged 39-70: Has the patient had a colonoscopy / FIT/ Cologuard? Yes - no Care Gap present      If the patient is female:    4. For patients aged 41-77: Has the patient had a mammogram within the past 2 years? NA - based on age or sex      11. For patients aged 21-65: Has the patient had a pap smear? NA - based on age or sex    Nadiya Salas is a 57 y.o. male, evaluated via audio-only technology on 1/3/2023 for Shoulder Pain (left) and Foot Pain (right)  . Assessment & Plan:   Diagnoses and all orders for this visit:    1. Chronic left shoulder pain  -     XR SHOULDER LT AP/LAT MIN 2 V; Future    2. Right foot pain  -     XR FOOT RT MIN 3 V; Future    The complexity of medical decision making for this visit is moderate       12  Subjective:       Prior to Admission medications    Medication Sig Start Date End Date Taking?  Authorizing Provider   atorvastatin (LIPITOR) 40 mg tablet TAKE 1 TABLET DAILY 10/18/22   Josh Knapp NP   hydroCHLOROthiazide (HYDRODIURIL) 25 mg tablet TAKE 1 TABLET DAILY FOR    HIGH BLOOD PRESSURE 10/18/22   Josh Knapp NP   dilTIAZem ER (CARDIZEM CD) 300 mg capsule TAKE 1 CAPSULE DAILY FOR   HIGH BLOOD PRESSURE 10/18/22   Josh Knapp NP enalapril (VASOTEC) 20 mg tablet TAKE 1 TABLET DAILY 10/18/22   Lillian Vences NP   buPROPion [facility]) 100 mg tablet Week 1 take 1 tab in AM-  Week 2 1 tab in the am and 1 tab in the pm- Week 3 -2 tabs in the am  1 tab in the pm -Week 4  2 tabs in the am and 2 tabs in the pm  Indications: Weight loss 8/3/22   Lillian Vences NP   naltrexone (DEPADE) 50 mg tablet Tke 1/4 tab in the am for 2 weeks , then increase to 1/2 tab twice a day 8/3/22   Lillian Vences NP   meloxicam (MOBIC) 15 mg tablet Take 1 Tablet by mouth in the morning. 8/2/22   Lillian Vences NP   DULoxetine (CYMBALTA) 60 mg capsule Take 1 Capsule by mouth two (2) times a day. Indications: chronic muscle or bone pain, neuropathic pain 5/4/22   Lillian Vences NP   colchicine (Colcrys) 0.6 mg tablet Take 2 tablets intially and then 1 tablet 1 hour later.   Max # 3 tablets  Indications: acute inflammation of the joints due to gout attack 1/31/22   Lillian Vences NP   amitriptyline (ELAVIL) 50 mg tablet TAKE 1 TABLET NIGHTLY  Indications: neuropathic pain 11/12/21   Lillian Vences NP     Patient Active Problem List   Diagnosis Code    Arthritis M19.90    Esophagitis determined by endoscopy K20.90    Hyperlipemia E78.5    Diverticulosis large intestine w/o perforation or abscess w/o bleeding K57.30    Obesity, morbid ([address] E66.01    Benign essential hypertension I10    Lumbar radiculopathy M54.16    Spondylosis of lumbar region without myelopathy or radiculopathy M47.816    Nocturia R35.1    GI bleed K92.2    HTN (hypertension) I10    Tobacco abuse Z72.0     Patient Active Problem List    Diagnosis Date Noted    GI bleed 11/12/2019    HTN (hypertension) 11/12/2019    Tobacco abuse 11/12/2019    Nocturia 11/06/2018    Obesity, morbid ([address] 01/22/2018    Spondylosis of lumbar region without myelopathy or radiculopathy 08/22/2017    Diverticulosis large intestine w/o perforation or abscess w/o bleeding 07/20/2015    Hyperlipemia 05/07/2015 Esophagitis determined by endoscopy 05/06/2015    Arthritis     Benign essential hypertension 08/13/2013    Lumbar radiculopathy 08/13/2013     Current Outpatient Medications   Medication Sig Dispense Refill    atorvastatin (LIPITOR) 40 mg tablet TAKE 1 TABLET DAILY 90 Tablet 3    hydroCHLOROthiazide (HYDRODIURIL) 25 mg tablet TAKE 1 TABLET DAILY FOR    HIGH BLOOD PRESSURE 90 Tablet 3    dilTIAZem ER (CARDIZEM CD) 300 mg capsule TAKE 1 CAPSULE DAILY FOR   HIGH BLOOD PRESSURE 90 Capsule 3    enalapril (VASOTEC) 20 mg tablet TAKE 1 TABLET DAILY 90 Tablet 3    buPROPion (WELLBUTRIN) 100 mg tablet Week 1 take 1 tab in AM-  Week 2 1 tab in the am and 1 tab in the pm- Week 3 -2 tabs in the am  1 tab in the pm -Week 4  2 tabs in the am and 2 tabs in the pm  Indications: Weight loss 90 Tablet 3    naltrexone (DEPADE) 50 mg tablet Tke 1/4 tab in the am for 2 weeks , then increase to 1/2 tab twice a day 90 Tablet 0    meloxicam (MOBIC) 15 mg tablet Take 1 Tablet by mouth in the morning. 90 Tablet 1    DULoxetine (CYMBALTA) 60 mg capsule Take 1 Capsule by mouth two (2) times a day. Indications: chronic muscle or bone pain, neuropathic pain 180 Capsule 1    colchicine (Colcrys) 0.6 mg tablet Take 2 tablets intially and then 1 tablet 1 hour later.   Max # 3 tablets  Indications: acute inflammation of the joints due to gout attack 28 Tablet 3    amitriptyline (ELAVIL) 50 mg tablet TAKE 1 TABLET NIGHTLY  Indications: neuropathic pain 90 Tablet 0     No Known Allergies  Past Medical History:   Diagnosis Date    Arthritis     Diverticulosis     Enureses     Gout     Hypercholesterolemia     Hypertension     Nocturia     Sciatica      Past Surgical History:   Procedure Laterality Date    HX COLONOSCOPY  022011    HX COLONOSCOPY  8/2015    polyp x1     Family History   Problem Relation Age of Onset    Diabetes Mother     Heart Disease Mother     Cancer Father         prostate     Social History     Tobacco Use    Smoking status: Light Smoker     Types: Cigarettes    Smokeless tobacco: Never    Tobacco comments:     occassionally   Substance Use Topics    Alcohol use: Yes       ROS    No data recorded     Angela Emery Sr was evaluated through a patient-initiated, synchronous (real-time) audio only encounter. He (or guardian if applicable) is aware that it is a billable service, which includes applicable co-pays, with coverage as determined by his insurance carrier. This visit was conducted with the patient's (and/or Alayna Montgomery guardian's) verbal consent. He has not had a related appointment within my department in the past 7 days or scheduled within the next 24 hours. The patient was located in a state where the provider was licensed to provide care. The patient was located at: Home: [address] [place] [address]  The provider was located at: Facility (Appt Department): [id number] [place] [id number]    Total Time: minutes: 21-30 minutes  Follow up prn if symptoms persist or worsen.   Linda Hutchison NP

## 2023-01-04 NOTE — ACP (ADVANCE CARE PLANNING)
Discussed importance of advanced medical directives with patient. Patient is capable of making decisions.   Lazara Trevizo NP-C

## 2023-01-10 RX ORDER — AMITRIPTYLINE HYDROCHLORIDE 50 MG/1
TABLET, FILM COATED ORAL
Qty: 90 TABLET | Refills: 0 | Status: SHIPPED | OUTPATIENT
Start: 2023-01-10 | End: 2023-01-15

## 2023-01-15 RX ORDER — AMITRIPTYLINE HYDROCHLORIDE 50 MG/1
TABLET, FILM COATED ORAL
Qty: 90 TABLET | Refills: 0 | Status: SHIPPED | OUTPATIENT
Start: 2023-01-15

## 2023-01-31 ENCOUNTER — OFFICE VISIT (OUTPATIENT)
Dept: FAMILY MEDICINE CLINIC | Age: 63
End: 2023-01-31
Payer: MEDICARE

## 2023-01-31 VITALS
DIASTOLIC BLOOD PRESSURE: 74 MMHG | WEIGHT: 274 LBS | TEMPERATURE: 97.7 F | SYSTOLIC BLOOD PRESSURE: 138 MMHG | HEART RATE: 84 BPM | HEIGHT: 70 IN | RESPIRATION RATE: 20 BRPM | BODY MASS INDEX: 39.22 KG/M2

## 2023-01-31 DIAGNOSIS — E78.5 HYPERLIPIDEMIA, UNSPECIFIED HYPERLIPIDEMIA TYPE: ICD-10-CM

## 2023-01-31 DIAGNOSIS — E66.01 OBESITY, MORBID (HCC): ICD-10-CM

## 2023-01-31 DIAGNOSIS — I10 PRIMARY HYPERTENSION: Primary | ICD-10-CM

## 2023-01-31 DIAGNOSIS — R35.1 NOCTURIA: ICD-10-CM

## 2023-01-31 PROCEDURE — 3017F COLORECTAL CA SCREEN DOC REV: CPT | Performed by: NURSE PRACTITIONER

## 2023-01-31 PROCEDURE — G8432 DEP SCR NOT DOC, RNG: HCPCS | Performed by: NURSE PRACTITIONER

## 2023-01-31 PROCEDURE — 99214 OFFICE O/P EST MOD 30 MIN: CPT | Performed by: NURSE PRACTITIONER

## 2023-01-31 PROCEDURE — 3078F DIAST BP <80 MM HG: CPT | Performed by: NURSE PRACTITIONER

## 2023-01-31 PROCEDURE — 3075F SYST BP GE 130 - 139MM HG: CPT | Performed by: NURSE PRACTITIONER

## 2023-01-31 PROCEDURE — 36415 COLL VENOUS BLD VENIPUNCTURE: CPT | Performed by: NURSE PRACTITIONER

## 2023-01-31 PROCEDURE — G8417 CALC BMI ABV UP PARAM F/U: HCPCS | Performed by: NURSE PRACTITIONER

## 2023-01-31 NOTE — PROGRESS NOTES
Chief Complaint   Patient presents with    Hypertension     1. \"Have you been to the ER, urgent care clinic since your last visit? Hospitalized since your last visit? \" No    2. \"Have you seen or consulted any other health care providers outside of the [address] [place] [address] since your last visit? \" No     3. For patients aged 39-70: Has the patient had a colonoscopy / FIT/ Cologuard? Yes - no Care Gap present      If the patient is female:    4. For patients aged 41-77: Has the patient had a mammogram within the past 2 years? NA - based on age or sex      11. For patients aged 21-65: Has the patient had a pap smear?  NA - based on age or sex

## 2023-02-01 LAB
ALBUMIN SERPL-MCNC: 4.1 G/DL (ref 3.5–5)
ALBUMIN/GLOB SERPL: 1.2 (ref 1.1–2.2)
ALP SERPL-CCNC: 95 U/L (ref 45–117)
ALT SERPL-CCNC: 46 U/L (ref 12–78)
ANION GAP SERPL CALC-SCNC: 6 MMOL/L (ref 5–15)
AST SERPL-CCNC: 24 U/L (ref 15–37)
BASOPHILS # BLD: 0 K/UL (ref 0–0.1)
BASOPHILS NFR BLD: 1 % (ref 0–1)
BILIRUB SERPL-MCNC: 0.5 MG/DL (ref 0.2–1)
BUN SERPL-MCNC: 12 MG/DL (ref 6–20)
BUN/CREAT SERPL: 9 (ref 12–20)
CALCIUM SERPL-MCNC: 9.3 MG/DL (ref 8.5–10.1)
CHLORIDE SERPL-SCNC: 104 MMOL/L (ref 97–108)
CHOLEST SERPL-MCNC: 126 MG/DL
CO2 SERPL-SCNC: 28 MMOL/L (ref 21–32)
CREAT SERPL-MCNC: 1.28 MG/DL (ref 0.7–1.3)
DIFFERENTIAL METHOD BLD: ABNORMAL
EOSINOPHIL # BLD: 0.1 K/UL (ref 0–0.4)
EOSINOPHIL NFR BLD: 1 % (ref 0–7)
ERYTHROCYTE [DISTWIDTH] IN BLOOD BY AUTOMATED COUNT: 14 % (ref 11.5–14.5)
GLOBULIN SER CALC-MCNC: 3.4 G/DL (ref 2–4)
GLUCOSE SERPL-MCNC: 124 MG/DL (ref 65–100)
HCT VFR BLD AUTO: 40.5 % (ref 36.6–50.3)
HDLC SERPL-MCNC: 41 MG/DL
HDLC SERPL: 3.1 (ref 0–5)
HGB BLD-MCNC: 12 G/DL (ref 12.1–17)
IMM GRANULOCYTES # BLD AUTO: 0 K/UL (ref 0–0.04)
IMM GRANULOCYTES NFR BLD AUTO: 0 % (ref 0–0.5)
LDLC SERPL CALC-MCNC: 58.4 MG/DL (ref 0–100)
LYMPHOCYTES # BLD: 2.5 K/UL (ref 0.8–3.5)
LYMPHOCYTES NFR BLD: 37 % (ref 12–49)
MCH RBC QN AUTO: 24.4 PG (ref 26–34)
MCHC RBC AUTO-ENTMCNC: 29.6 G/DL (ref 30–36.5)
MCV RBC AUTO: 82.5 FL (ref 80–99)
MONOCYTES # BLD: 0.6 K/UL (ref 0–1)
MONOCYTES NFR BLD: 8 % (ref 5–13)
NEUTS SEG # BLD: 3.7 K/UL (ref 1.8–8)
NEUTS SEG NFR BLD: 53 % (ref 32–75)
NRBC # BLD: 0 K/UL (ref 0–0.01)
NRBC BLD-RTO: 0 PER 100 WBC
PLATELET # BLD AUTO: 292 K/UL (ref 150–400)
PMV BLD AUTO: 10.1 FL (ref 8.9–12.9)
POTASSIUM SERPL-SCNC: 4 MMOL/L (ref 3.5–5.1)
PROT SERPL-MCNC: 7.5 G/DL (ref 6.4–8.2)
PSA SERPL-MCNC: 4.4 NG/ML (ref 0.01–4)
RBC # BLD AUTO: 4.91 M/UL (ref 4.1–5.7)
SODIUM SERPL-SCNC: 138 MMOL/L (ref 136–145)
TRIGL SERPL-MCNC: 133 MG/DL (ref ?–150)
VLDLC SERPL CALC-MCNC: 26.6 MG/DL
WBC # BLD AUTO: 6.9 K/UL (ref 4.1–11.1)

## 2023-02-01 NOTE — PROGRESS NOTES
Labs are stable except  2 areas PSA is elevated positive screen for prostate. Refer to urologist.  Does e want to see Dr. Marylee Shines in [address] [place] [address] or Dr. Ariana Pettit in [facility] or Dr. Stevie Weaver in [place]? Recommend checking HGBA1C  blood glucose was elevated. We can do a POCt as a nurse visit .

## 2023-02-01 NOTE — PROGRESS NOTES
Subjective:     Sheldon Ingram is a 57 y.o. male who presents today with the following:  Chief Complaint   Patient presents with    Hypertension       Patient Active Problem List   Diagnosis Code    Arthritis M19.90    Esophagitis determined by endoscopy K20.90    Hyperlipemia E78.5    Diverticulosis large intestine w/o perforation or abscess w/o bleeding K57.30    Obesity, morbid ([facility] [address] E66.01    Benign essential hypertension I10    Lumbar radiculopathy M54.16    Spondylosis of lumbar region without myelopathy or radiculopathy M47.816    Nocturia R35.1    GI bleed K92.2    HTN (hypertension) I10    Tobacco abuse Z72.0         COMPLIANT WITH MEDICATION:   HTN; Denies chest pain, dyspnea, palpitations, headache and blurred vision. Blood pressure normotensive.     depression screening addressed not at risk    abuse screening addressed denies    learning assessment addressed reviewed nurses notes    fall risk addressed not at risk    HM: addressed    ROS:  Gen: denies fever, chills, fatigue, weight loss, weight gain  HEENT:denies blurry vision, nasal congestion, sore throat  Resp: denies dypsnea, cough, wheezing  CV: denies chest pain radiating to the jaws or arms, palpitations, lower extremity edema  Abd: denies nausea, vomiting, diarrhea, constipation  Neuro: denies numbness/tingling  Endo: denies polyuria, polydipsia, heat/cold intolerance  Heme: no lymphadenopathy    No Known Allergies      Current Outpatient Medications:     amitriptyline (ELAVIL) 50 mg tablet, TAKE 1 TABLET BY MOUTH EVERY NIGHT FOR NERVE PAIN, Disp: 90 Tablet, Rfl: 0    atorvastatin (LIPITOR) 40 mg tablet, TAKE 1 TABLET DAILY, Disp: 90 Tablet, Rfl: 3    hydroCHLOROthiazide (HYDRODIURIL) 25 mg tablet, TAKE 1 TABLET DAILY FOR    HIGH BLOOD PRESSURE, Disp: 90 Tablet, Rfl: 3    dilTIAZem ER (CARDIZEM CD) 300 mg capsule, TAKE 1 CAPSULE DAILY FOR   HIGH BLOOD PRESSURE, Disp: 90 Capsule, Rfl: 3    enalapril (VASOTEC) 20 mg tablet, TAKE 1 TABLET DAILY, Disp: 90 Tablet, Rfl: 3    buPROPion (WELLBUTRIN) 100 mg tablet, Week 1 take 1 tab in AM-  Week 2 1 tab in the am and 1 tab in the pm- Week 3 -2 tabs in the am  1 tab in the pm -Week 4  2 tabs in the am and 2 tabs in the pm  Indications: Weight loss, Disp: 90 Tablet, Rfl: 3    naltrexone (DEPADE) 50 mg tablet, Tke 1/4 tab in the am for 2 weeks , then increase to 1/2 tab twice a day, Disp: 90 Tablet, Rfl: 0    meloxicam (MOBIC) 15 mg tablet, Take 1 Tablet by mouth in the morning., Disp: 90 Tablet, Rfl: 1    DULoxetine (CYMBALTA) 60 mg capsule, Take 1 Capsule by mouth two (2) times a day. Indications: chronic muscle or bone pain, neuropathic pain, Disp: 180 Capsule, Rfl: 1    colchicine (Colcrys) 0.6 mg tablet, Take 2 tablets intially and then 1 tablet 1 hour later.   Max # 3 tablets  Indications: acute inflammation of the joints due to gout attack, Disp: 28 Tablet, Rfl: 3    Past Medical History:   Diagnosis Date    Arthritis     Diverticulosis     Enureses     Gout     Hypercholesterolemia     Hypertension     Nocturia     Sciatica        Past Surgical History:   Procedure Laterality Date    HX COLONOSCOPY  022011    HX COLONOSCOPY  8/2015    polyp x1       Social History     Tobacco Use   Smoking Status Light Smoker    Types: Cigarettes   Smokeless Tobacco Never   Tobacco Comments    occassionally       Social History     Socioeconomic History    Marital status:    Tobacco Use    Smoking status: Light Smoker     Types: Cigarettes    Smokeless tobacco: Never    Tobacco comments:     occassionally   Vaping Use    Vaping Use: Never used   Substance and Sexual Activity    Alcohol use: Yes    Drug use: No   Other Topics Concern     Service No    Blood Transfusions No    Caffeine Concern No    Occupational Exposure No    Hobby Hazards No    Sleep Concern No    Stress Concern No    Weight Concern Yes    Special Diet No    Back Care Yes     Comment: back Lower Lumbar problems    Exercise Yes    Bike Helmet No Seat Belt Yes    Self-Exams Yes     Social Determinants of Health     Financial Resource Strain: Low Risk     Difficulty of Paying Living Expenses: Not hard at all   Food Insecurity: No Food Insecurity    Worried About Running Out of Food in the Last Year: Never true    Ran Out of Food in the Last Year: Never true   Transportation Needs: No Transportation Needs    Lack of Transportation (Medical): No    Lack of Transportation (Non-Medical): No       Family History   Problem Relation Age of Onset    Diabetes Mother     Heart Disease Mother     Cancer Father         prostate         Objective:     Visit Vitals  /74   Pulse 84   Temp 97.7 °F (36.5 °C) (Temporal)   Resp 20   Ht 5' 10\" (1.778 m)   Wt 274 lb (124.3 kg)   BMI 39.31 kg/m²     Body mass index is 39.31 kg/m². General: Alert and oriented. No acute distress. Well nourished  HEENT :  Ears:TMs are normal. Canals are clear. Eyes: pupils equal, round, react to light and accommodation. Extra ocular movements intact. Nose: patent. Mouth and throat is clear. Neck:supple full range of motion no thyromegaly. Trachea midline, No carotid bruits. No significant lymphadenopathy  Lungs[de-identified] clear to auscultation without wheezes, rales, or rhonchi. Heart :RRR, S1 & S2 are normal intensity. No murmur; no gallop  Abdomen: bowel sounds active. No tenderness, guarding, rebound, masses, hepatic or spleen enlargement  Back: no CVA tenderness. Extremities: without clubbing, cyanosis, or edema  Pulses: radial and femoral pulses are normal  Neuro: HMF intact. Cranial nerves II through XII grossly normal.  Motor: is 5 over 5 and symmetrical.   Deep tendon reflexes: +2 equal  Psych:appropriate behavior, mood, affect and judgement.    Results for orders placed or performed in visit on 27/57/07   METABOLIC PANEL, COMPREHENSIVE   Result Value Ref Range    Sodium 135 (L) 136 - 145 mmol/L    Potassium 4.6 3.5 - 5.1 mmol/L    Chloride 103 97 - 108 mmol/L    CO2 28 21 - 32 mmol/L Anion gap 4 (L) 5 - 15 mmol/L    Glucose 158 (H) 65 - 100 mg/dL    BUN 8 6 - 20 MG/DL    Creatinine 1.26 0.70 - 1.30 MG/DL    BUN/Creatinine ratio 6 (L) 12 - 20      GFR est AA >60 >60 ml/min/1.73m2    GFR est non-AA 58 (L) >60 ml/min/1.73m2    Calcium 9.6 8.5 - 10.1 MG/DL    Bilirubin, total 0.4 0.2 - 1.0 MG/DL    ALT (SGPT) 36 12 - 78 U/L    AST (SGOT) 21 15 - 37 U/L    Alk. phosphatase 100 45 - 117 U/L    Protein, total 7.8 6.4 - 8.2 g/dL    Albumin 4.2 3.5 - 5.0 g/dL    Globulin 3.6 2.0 - 4.0 g/dL    A-G Ratio 1.2 1.1 - 2.2     LIPID PANEL   Result Value Ref Range    Cholesterol, total 139 <200 MG/DL    Triglyceride 88 <150 MG/DL    HDL Cholesterol 57 MG/DL    LDL, calculated 64.4 0 - 100 MG/DL    VLDL, calculated 17.6 MG/DL    CHOL/HDL Ratio 2.4 0.0 - 5.0     CBC WITH AUTOMATED DIFF   Result Value Ref Range    WBC 9.7 4.1 - 11.1 K/uL    RBC 4.64 4.10 - 5.70 M/uL    HGB 11.7 (L) 12.1 - 17.0 g/dL    HCT 40.5 36.6 - 50.3 %    MCV 87.3 80.0 - 99.0 FL    MCH 25.2 (L) 26.0 - 34.0 PG    MCHC 28.9 (L) 30.0 - 36.5 g/dL    RDW 14.0 11.5 - 14.5 %    PLATELET 906 822 - 578 K/uL    MPV 9.7 8.9 - 12.9 FL    NRBC 0.0 0  WBC    ABSOLUTE NRBC 0.00 0.00 - 0.01 K/uL    NEUTROPHILS 63 32 - 75 %    LYMPHOCYTES 27 12 - 49 %    MONOCYTES 8 5 - 13 %    EOSINOPHILS 1 0 - 7 %    BASOPHILS 1 0 - 1 %    IMMATURE GRANULOCYTES 0 0.0 - 0.5 %    ABS. NEUTROPHILS 6.1 1.8 - 8.0 K/UL    ABS. LYMPHOCYTES 2.6 0.8 - 3.5 K/UL    ABS. MONOCYTES 0.8 0.0 - 1.0 K/UL    ABS. EOSINOPHILS 0.1 0.0 - 0.4 K/UL    ABS. BASOPHILS 0.1 0.0 - 0.1 K/UL    ABS. IMM. GRANS. 0.0 0.00 - 0.04 K/UL    DF SMEAR SCANNED      RBC COMMENTS HYPOCHROMIA  1+           No results found for this visit on 01/31/23. Assessment/ Plan:     1. Hyperlipidemia, unspecified hyperlipidemia type    - CBC WITH AUTOMATED DIFF; Future  - LIPID PANEL; Future  - METABOLIC PANEL, COMPREHENSIVE;  Future  - COLLECTION VENOUS BLOOD,VENIPUNCTURE; Future  - PSA, DIAGNOSTIC (PROSTATE SPECIFIC AG); Future  - PSA, DIAGNOSTIC (PROSTATE SPECIFIC AG)  - COLLECTION VENOUS BLOOD,VENIPUNCTURE  - METABOLIC PANEL, COMPREHENSIVE  - LIPID PANEL  - CBC WITH AUTOMATED DIFF    2. Primary hypertension    - CBC WITH AUTOMATED DIFF; Future  - LIPID PANEL; Future  - METABOLIC PANEL, COMPREHENSIVE; Future  - COLLECTION VENOUS BLOOD,VENIPUNCTURE; Future  - PSA, DIAGNOSTIC (PROSTATE SPECIFIC AG); Future  - PSA, DIAGNOSTIC (PROSTATE SPECIFIC AG)  - COLLECTION VENOUS BLOOD,VENIPUNCTURE  - METABOLIC PANEL, COMPREHENSIVE  - LIPID PANEL  - CBC WITH AUTOMATED DIFF    3. Obesity, morbid ([address]    - CBC WITH AUTOMATED DIFF; Future  - LIPID PANEL; Future  - METABOLIC PANEL, COMPREHENSIVE; Future  - COLLECTION VENOUS BLOOD,VENIPUNCTURE; Future  - PSA, DIAGNOSTIC (PROSTATE SPECIFIC AG); Future  - PSA, DIAGNOSTIC (PROSTATE SPECIFIC AG)  - COLLECTION VENOUS BLOOD,VENIPUNCTURE  - METABOLIC PANEL, COMPREHENSIVE  - LIPID PANEL  - CBC WITH AUTOMATED DIFF    4. Nocturia    - CBC WITH AUTOMATED DIFF; Future  - LIPID PANEL; Future  - METABOLIC PANEL, COMPREHENSIVE; Future  - COLLECTION VENOUS BLOOD,VENIPUNCTURE; Future  - PSA, DIAGNOSTIC (PROSTATE SPECIFIC AG);  Future  - PSA, DIAGNOSTIC (PROSTATE SPECIFIC AG)  - COLLECTION VENOUS BLOOD,VENIPUNCTURE  - METABOLIC PANEL, COMPREHENSIVE  - LIPID PANEL  - CBC WITH AUTOMATED DIFF          Orders Placed This Encounter    COLLECTION VENOUS BLOOD,VENIPUNCTURE     Standing Status:   Future     Number of Occurrences:   1     Standing Expiration Date:   2/28/2023    CBC WITH AUTOMATED DIFF     Standing Status:   Future     Number of Occurrences:   1     Standing Expiration Date:   2/28/2023    LIPID PANEL     Standing Status:   Future     Number of Occurrences:   1     Standing Expiration Date:   7/35/9119    METABOLIC PANEL, COMPREHENSIVE     Standing Status:   Future     Number of Occurrences:   1     Standing Expiration Date:   2/28/2023    PROSTATE SPECIFIC AG     Standing Status:   Future Number of Occurrences:   1     Standing Expiration Date:   2/28/2023         Verbal and written instructions (see AVS) provided. Patient expresses understanding of diagnosis and treatment plan. There are no preventive care reminders to display for this patient.       follow up in 3 months or sooner      PABLO Altman

## 2023-02-02 ENCOUNTER — TELEPHONE (OUTPATIENT)
Dept: FAMILY MEDICINE CLINIC | Age: 63
End: 2023-02-02

## 2023-02-02 LAB
EST. AVERAGE GLUCOSE BLD GHB EST-MCNC: 126 MG/DL
HBA1C MFR BLD: 6 % (ref 4–5.6)

## 2023-02-02 NOTE — TELEPHONE ENCOUNTER
ISABEL Boone to confirm if lavender sent in on 01/31/2023 still good to add on HgbA1C? Per Sonali Carr, still good to run the test, send in add on form.

## 2023-02-02 NOTE — TELEPHONE ENCOUNTER
Add on faxed with Transmission Log confirmation. Pt called and informed of the HgbA1C test add on done, does not have to come back to the office, stated OK of understanding and thanks so much. Romie Christian verbally informed as well, stated OK and thanks.

## 2023-02-02 NOTE — PROGRESS NOTES
Patient identified by two patient identifiers, name and date of birth. Spoke with patient. Patient aware of results and states understanding at this time. Pt stated, he will take Dr. Sheba Ríos, he lives in the [address] [place] [address]. Also, I stated to him, I will check with the lab to confirm if the HgbA1c can be added on? Please if can, hard to get back to the office.

## 2023-02-03 NOTE — PROGRESS NOTES
Patient verified by stating name and date of birth.  Patient informed of lab results and states understanding per Isaura Christie

## 2023-02-13 DIAGNOSIS — M79.2 NERVE PAIN: Primary | ICD-10-CM

## 2023-02-13 RX ORDER — AMITRIPTYLINE HYDROCHLORIDE 50 MG/1
TABLET, FILM COATED ORAL
Qty: 90 TABLET | Refills: 0 | Status: SHIPPED | OUTPATIENT
Start: 2023-02-13 | End: 2023-02-16 | Stop reason: SDUPTHER

## 2023-02-14 ENCOUNTER — TELEPHONE (OUTPATIENT)
Dept: FAMILY MEDICINE CLINIC | Age: 63
End: 2023-02-14

## 2023-02-14 DIAGNOSIS — M25.512 CHRONIC LEFT SHOULDER PAIN: ICD-10-CM

## 2023-02-14 DIAGNOSIS — M79.671 RIGHT FOOT PAIN: ICD-10-CM

## 2023-02-14 DIAGNOSIS — G89.29 CHRONIC LEFT SHOULDER PAIN: ICD-10-CM

## 2023-02-14 NOTE — TELEPHONE ENCOUNTER
ML for x-ray results, right foot has bunion, if causing symptoms can set up with podiatrist or orthopaedist. Also left shoulder shows arthritis also recommend orthopaedics referral

## 2023-02-14 NOTE — TELEPHONE ENCOUNTER
Patient verified stating name and date of birth. Patient informed of xray results results and states understanding.   Patient is interested in an orthopaedist referral in [facility] for shoulder arthritis

## 2023-02-16 ENCOUNTER — TELEPHONE (OUTPATIENT)
Dept: FAMILY MEDICINE CLINIC | Age: 63
End: 2023-02-16

## 2023-02-16 DIAGNOSIS — M79.2 NERVE PAIN: ICD-10-CM

## 2023-02-16 RX ORDER — AMITRIPTYLINE HYDROCHLORIDE 50 MG/1
TABLET, FILM COATED ORAL
Qty: 90 TABLET | Refills: 3 | Status: SHIPPED | OUTPATIENT
Start: 2023-02-16

## 2023-02-16 NOTE — TELEPHONE ENCOUNTER
Wife called states she was told asher has arthritis.  They would like the \"strongest\" medication for it called in to  [facility]

## 2023-03-27 ENCOUNTER — TELEPHONE (OUTPATIENT)
Dept: FAMILY MEDICINE CLINIC | Age: 63
End: 2023-03-27

## 2023-03-27 NOTE — LETTER
3/28/2023    Mr. Andrew Peraza  [address] [address] [address]      Dear Andrew Peraza        Please call me if you have any questions: [phone]        Sincerely,Mr. Jayme Vides is requesting gabapentin. Rey Yepez will need a UDS and a controlled substance agreement filled out.  Please call patient to let him know. Thanks!  Monisha Loomis, NP

## 2023-03-28 NOTE — TELEPHONE ENCOUNTER
RACHANA pt, informed of Yamel's advice, stated OK of understanding and thanks. He will schedule the NV as soon as he can.

## 2023-04-02 RX ORDER — MELOXICAM 15 MG/1
15 TABLET ORAL DAILY
Qty: 90 TABLET | Refills: 1 | Status: SHIPPED | OUTPATIENT
Start: 2023-04-02

## 2023-04-25 DIAGNOSIS — M10.00 IDIOPATHIC GOUT, UNSPECIFIED CHRONICITY, UNSPECIFIED SITE: ICD-10-CM

## 2023-04-25 RX ORDER — COLCHICINE 0.6 MG/1
TABLET ORAL
Qty: 28 TABLET | Refills: 3 | Status: SHIPPED | OUTPATIENT
Start: 2023-04-25

## 2023-07-19 RX ORDER — ATORVASTATIN CALCIUM 40 MG/1
40 TABLET, FILM COATED ORAL DAILY
Qty: 30 TABLET | Refills: 1 | Status: SHIPPED | OUTPATIENT
Start: 2023-07-19 | End: 2023-07-19

## 2023-07-19 RX ORDER — ATORVASTATIN CALCIUM 40 MG/1
40 TABLET, FILM COATED ORAL DAILY
Qty: 90 TABLET | Refills: 1 | Status: SHIPPED | OUTPATIENT
Start: 2023-07-19

## 2023-07-19 RX ORDER — COLCHICINE 0.6 MG/1
TABLET ORAL
Qty: 30 TABLET | Refills: 1 | Status: SHIPPED | OUTPATIENT
Start: 2023-07-19

## 2023-07-19 RX ORDER — DILTIAZEM HYDROCHLORIDE 300 MG/1
CAPSULE, EXTENDED RELEASE ORAL
Qty: 90 CAPSULE | Refills: 1 | Status: SHIPPED | OUTPATIENT
Start: 2023-07-19

## 2023-07-19 RX ORDER — DILTIAZEM HYDROCHLORIDE 300 MG/1
CAPSULE, EXTENDED RELEASE ORAL
Qty: 30 CAPSULE | Refills: 1 | Status: SHIPPED | OUTPATIENT
Start: 2023-07-19 | End: 2023-07-19

## 2023-07-19 RX ORDER — ENALAPRIL MALEATE 20 MG/1
20 TABLET ORAL DAILY
Qty: 90 TABLET | Refills: 1 | Status: SHIPPED | OUTPATIENT
Start: 2023-07-19

## 2023-07-19 RX ORDER — ENALAPRIL MALEATE 20 MG/1
20 TABLET ORAL DAILY
Qty: 30 TABLET | Refills: 1 | Status: SHIPPED | OUTPATIENT
Start: 2023-07-19 | End: 2023-07-19

## 2023-07-19 RX ORDER — HYDROCHLOROTHIAZIDE 25 MG/1
TABLET ORAL
Qty: 90 TABLET | Refills: 1 | Status: SHIPPED | OUTPATIENT
Start: 2023-07-19

## 2023-07-25 RX ORDER — MELOXICAM 15 MG/1
15 TABLET ORAL DAILY
Qty: 90 TABLET | Refills: 1 | Status: SHIPPED | OUTPATIENT
Start: 2023-07-25

## 2023-07-25 RX ORDER — AMITRIPTYLINE HYDROCHLORIDE 50 MG/1
TABLET, FILM COATED ORAL
Qty: 90 TABLET | Refills: 0 | Status: SHIPPED | OUTPATIENT
Start: 2023-07-25

## 2023-07-25 NOTE — TELEPHONE ENCOUNTER
Last OV 1/31/2023  Requested Prescriptions     Pending Prescriptions Disp Refills    meloxicam (MOBIC) 15 MG tablet 90 tablet 1     Sig: Take 1 tablet by mouth daily    amitriptyline (ELAVIL) 50 MG tablet 90 tablet 0     Sig: TAKE 1 TABLET BY MOUTH EVERY NIGHT FOR NERVE PAIN

## 2023-07-25 NOTE — TELEPHONE ENCOUNTER
Medication Refill Request    Keke Blevins is requesting a refill of the following medication(s):   meloxicam (MOBIC) 15 MG tablet    amitriptyline (ELAVIL) 50 MG tablet  Please send refill to:     [address] [facility]

## 2023-07-31 RX ORDER — DILTIAZEM HYDROCHLORIDE 300 MG/1
CAPSULE, EXTENDED RELEASE ORAL
Qty: 90 CAPSULE | Refills: 1 | Status: SHIPPED | OUTPATIENT
Start: 2023-07-31

## 2023-07-31 RX ORDER — ATORVASTATIN CALCIUM 40 MG/1
40 TABLET, FILM COATED ORAL DAILY
Qty: 90 TABLET | Refills: 1 | Status: SHIPPED | OUTPATIENT
Start: 2023-07-31

## 2023-07-31 RX ORDER — HYDROCHLOROTHIAZIDE 25 MG/1
TABLET ORAL
Qty: 90 TABLET | Refills: 1 | Status: SHIPPED | OUTPATIENT
Start: 2023-07-31

## 2023-07-31 RX ORDER — ENALAPRIL MALEATE 20 MG/1
20 TABLET ORAL DAILY
Qty: 90 TABLET | Refills: 1 | Status: SHIPPED | OUTPATIENT
Start: 2023-07-31

## 2023-09-28 RX ORDER — AMITRIPTYLINE HYDROCHLORIDE 50 MG/1
TABLET, FILM COATED ORAL
Qty: 90 TABLET | Refills: 0 | Status: SHIPPED | OUTPATIENT
Start: 2023-09-28

## 2023-11-28 ENCOUNTER — OFFICE VISIT (OUTPATIENT)
Age: 63
End: 2023-11-28
Payer: MEDICARE

## 2023-11-28 DIAGNOSIS — E78.2 MIXED HYPERLIPIDEMIA: ICD-10-CM

## 2023-11-28 DIAGNOSIS — I10 ESSENTIAL (PRIMARY) HYPERTENSION: ICD-10-CM

## 2023-11-28 DIAGNOSIS — Z00.00 ENCOUNTER FOR MEDICARE ANNUAL WELLNESS EXAM: Primary | ICD-10-CM

## 2023-11-28 DIAGNOSIS — R35.1 NOCTURIA: ICD-10-CM

## 2023-11-28 DIAGNOSIS — Z87.898 HISTORY OF ELEVATED PROSTATE SPECIFIC ANTIGEN (PSA): ICD-10-CM

## 2023-11-28 PROCEDURE — 36415 COLL VENOUS BLD VENIPUNCTURE: CPT | Performed by: NURSE PRACTITIONER

## 2023-11-28 PROCEDURE — 3074F SYST BP LT 130 MM HG: CPT | Performed by: NURSE PRACTITIONER

## 2023-11-28 PROCEDURE — 1123F ACP DISCUSS/DSCN MKR DOCD: CPT | Performed by: NURSE PRACTITIONER

## 2023-11-28 PROCEDURE — 3078F DIAST BP <80 MM HG: CPT | Performed by: NURSE PRACTITIONER

## 2023-11-28 PROCEDURE — G0439 PPPS, SUBSEQ VISIT: HCPCS | Performed by: NURSE PRACTITIONER

## 2023-11-28 PROCEDURE — 3017F COLORECTAL CA SCREEN DOC REV: CPT | Performed by: NURSE PRACTITIONER

## 2023-11-28 PROCEDURE — G8484 FLU IMMUNIZE NO ADMIN: HCPCS | Performed by: NURSE PRACTITIONER

## 2023-11-28 RX ORDER — DILTIAZEM HYDROCHLORIDE 300 MG/1
CAPSULE, EXTENDED RELEASE ORAL
Qty: 90 CAPSULE | Refills: 1 | Status: SHIPPED | OUTPATIENT
Start: 2023-11-28

## 2023-11-28 RX ORDER — HYDROCHLOROTHIAZIDE 25 MG/1
TABLET ORAL
Qty: 90 TABLET | Refills: 3 | Status: SHIPPED | OUTPATIENT
Start: 2023-11-28

## 2023-11-28 RX ORDER — AMITRIPTYLINE HYDROCHLORIDE 50 MG/1
TABLET, FILM COATED ORAL
Qty: 90 TABLET | Refills: 3 | Status: SHIPPED | OUTPATIENT
Start: 2023-11-28

## 2023-11-28 RX ORDER — ATORVASTATIN CALCIUM 40 MG/1
40 TABLET, FILM COATED ORAL DAILY
Qty: 90 TABLET | Refills: 3 | Status: SHIPPED | OUTPATIENT
Start: 2023-11-28

## 2023-11-28 RX ORDER — COLCHICINE 0.6 MG/1
TABLET ORAL
Qty: 30 TABLET | Refills: 5 | Status: SHIPPED | OUTPATIENT
Start: 2023-11-28

## 2023-11-28 RX ORDER — MELOXICAM 15 MG/1
15 TABLET ORAL DAILY
Qty: 90 TABLET | Refills: 3 | Status: SHIPPED | OUTPATIENT
Start: 2023-11-28

## 2023-11-28 SDOH — ECONOMIC STABILITY: INCOME INSECURITY: IN THE LAST 12 MONTHS, WAS THERE A TIME WHEN YOU WERE NOT ABLE TO PAY THE MORTGAGE OR RENT ON TIME?: NO

## 2023-11-28 SDOH — ECONOMIC STABILITY: HOUSING INSECURITY: IN THE LAST 12 MONTHS, HOW MANY PLACES HAVE YOU LIVED?: 1

## 2023-11-28 SDOH — ECONOMIC STABILITY: HOUSING INSECURITY
IN THE LAST 12 MONTHS, WAS THERE A TIME WHEN YOU DID NOT HAVE A STEADY PLACE TO SLEEP OR SLEPT IN A SHELTER (INCLUDING NOW)?: NO

## 2023-11-28 ASSESSMENT — LIFESTYLE VARIABLES
HOW MANY STANDARD DRINKS CONTAINING ALCOHOL DO YOU HAVE ON A TYPICAL DAY: PATIENT DOES NOT DRINK
HOW OFTEN DO YOU HAVE A DRINK CONTAINING ALCOHOL: NEVER

## 2023-11-28 ASSESSMENT — SOCIAL DETERMINANTS OF HEALTH (SDOH)
WITHIN THE LAST YEAR, HAVE YOU BEEN AFRAID OF YOUR PARTNER OR EX-PARTNER?: NO
WITHIN THE LAST YEAR, HAVE YOU BEEN KICKED, HIT, SLAPPED, OR OTHERWISE PHYSICALLY HURT BY YOUR PARTNER OR EX-PARTNER?: NO
WITHIN THE LAST YEAR, HAVE YOU BEEN HUMILIATED OR EMOTIONALLY ABUSED IN OTHER WAYS BY YOUR PARTNER OR EX-PARTNER?: NO
WITHIN THE LAST YEAR, HAVE TO BEEN RAPED OR FORCED TO HAVE ANY KIND OF SEXUAL ACTIVITY BY YOUR PARTNER OR EX-PARTNER?: NO

## 2023-11-28 ASSESSMENT — PATIENT HEALTH QUESTIONNAIRE - PHQ9
SUM OF ALL RESPONSES TO PHQ QUESTIONS 1-9: 0
2. FEELING DOWN, DEPRESSED OR HOPELESS: 0
1. LITTLE INTEREST OR PLEASURE IN DOING THINGS: 0
SUM OF ALL RESPONSES TO PHQ9 QUESTIONS 1 & 2: 0

## 2023-11-29 VITALS
SYSTOLIC BLOOD PRESSURE: 136 MMHG | RESPIRATION RATE: 20 BRPM | DIASTOLIC BLOOD PRESSURE: 82 MMHG | WEIGHT: 274 LBS | OXYGEN SATURATION: 97 % | BODY MASS INDEX: 39.22 KG/M2 | HEART RATE: 84 BPM | HEIGHT: 70 IN | TEMPERATURE: 97.9 F

## 2023-11-29 LAB
ALBUMIN SERPL-MCNC: 4.1 G/DL (ref 3.5–5)
ALBUMIN/GLOB SERPL: 1.1 (ref 1.1–2.2)
ALP SERPL-CCNC: 111 U/L (ref 45–117)
ALT SERPL-CCNC: 30 U/L (ref 12–78)
ANION GAP SERPL CALC-SCNC: 6 MMOL/L (ref 5–15)
AST SERPL-CCNC: 23 U/L (ref 15–37)
BASOPHILS # BLD: 0.1 K/UL (ref 0–0.1)
BASOPHILS NFR BLD: 1 % (ref 0–1)
BILIRUB SERPL-MCNC: 0.4 MG/DL (ref 0.2–1)
BUN SERPL-MCNC: 16 MG/DL (ref 6–20)
BUN/CREAT SERPL: 12 (ref 12–20)
CALCIUM SERPL-MCNC: 9.1 MG/DL (ref 8.5–10.1)
CHLORIDE SERPL-SCNC: 103 MMOL/L (ref 97–108)
CHOLEST SERPL-MCNC: 146 MG/DL
CO2 SERPL-SCNC: 27 MMOL/L (ref 21–32)
CREAT SERPL-MCNC: 1.29 MG/DL (ref 0.7–1.3)
DIFFERENTIAL METHOD BLD: ABNORMAL
EOSINOPHIL # BLD: 0.1 K/UL (ref 0–0.4)
EOSINOPHIL NFR BLD: 1 % (ref 0–7)
ERYTHROCYTE [DISTWIDTH] IN BLOOD BY AUTOMATED COUNT: 13.7 % (ref 11.5–14.5)
GLOBULIN SER CALC-MCNC: 3.8 G/DL (ref 2–4)
GLUCOSE SERPL-MCNC: 114 MG/DL (ref 65–100)
HCT VFR BLD AUTO: 40.8 % (ref 36.6–50.3)
HDLC SERPL-MCNC: 43 MG/DL
HDLC SERPL: 3.4 (ref 0–5)
HGB BLD-MCNC: 12 G/DL (ref 12.1–17)
IMM GRANULOCYTES # BLD AUTO: 0 K/UL (ref 0–0.04)
IMM GRANULOCYTES NFR BLD AUTO: 0 % (ref 0–0.5)
LDLC SERPL CALC-MCNC: 86.2 MG/DL (ref 0–100)
LYMPHOCYTES # BLD: 2.7 K/UL (ref 0.8–3.5)
LYMPHOCYTES NFR BLD: 32 % (ref 12–49)
MCH RBC QN AUTO: 24.7 PG (ref 26–34)
MCHC RBC AUTO-ENTMCNC: 29.4 G/DL (ref 30–36.5)
MCV RBC AUTO: 84 FL (ref 80–99)
MONOCYTES # BLD: 0.8 K/UL (ref 0–1)
MONOCYTES NFR BLD: 9 % (ref 5–13)
NEUTS SEG # BLD: 4.8 K/UL (ref 1.8–8)
NEUTS SEG NFR BLD: 57 % (ref 32–75)
NRBC # BLD: 0 K/UL (ref 0–0.01)
NRBC BLD-RTO: 0 PER 100 WBC
PLATELET # BLD AUTO: 320 K/UL (ref 150–400)
PMV BLD AUTO: 9.6 FL (ref 8.9–12.9)
POTASSIUM SERPL-SCNC: 4 MMOL/L (ref 3.5–5.1)
PROT SERPL-MCNC: 7.9 G/DL (ref 6.4–8.2)
PSA SERPL-MCNC: 4.6 NG/ML (ref 0.01–4)
RBC # BLD AUTO: 4.86 M/UL (ref 4.1–5.7)
SODIUM SERPL-SCNC: 136 MMOL/L (ref 136–145)
TRIGL SERPL-MCNC: 84 MG/DL
VLDLC SERPL CALC-MCNC: 16.8 MG/DL
WBC # BLD AUTO: 8.4 K/UL (ref 4.1–11.1)

## 2023-11-29 NOTE — PATIENT INSTRUCTIONS

## 2024-06-03 ENCOUNTER — OFFICE VISIT (OUTPATIENT)
Age: 64
End: 2024-06-03
Payer: MEDICARE

## 2024-06-03 VITALS
WEIGHT: 266 LBS | HEART RATE: 84 BPM | RESPIRATION RATE: 16 BRPM | HEIGHT: 69 IN | SYSTOLIC BLOOD PRESSURE: 136 MMHG | TEMPERATURE: 97 F | OXYGEN SATURATION: 97 % | BODY MASS INDEX: 39.4 KG/M2 | DIASTOLIC BLOOD PRESSURE: 84 MMHG

## 2024-06-03 DIAGNOSIS — Z00.00 ENCOUNTER FOR MEDICARE ANNUAL WELLNESS EXAM: Primary | ICD-10-CM

## 2024-06-03 DIAGNOSIS — R23.8 SKIN IRRITATION: ICD-10-CM

## 2024-06-03 DIAGNOSIS — Z86.39 HISTORY OF ELEVATED GLUCOSE: ICD-10-CM

## 2024-06-03 DIAGNOSIS — E66.01 SEVERE OBESITY (BMI 35.0-39.9) WITH COMORBIDITY (HCC): ICD-10-CM

## 2024-06-03 DIAGNOSIS — R97.20 ELEVATED PSA: ICD-10-CM

## 2024-06-03 DIAGNOSIS — I10 ESSENTIAL (PRIMARY) HYPERTENSION: ICD-10-CM

## 2024-06-03 PROCEDURE — 36415 COLL VENOUS BLD VENIPUNCTURE: CPT | Performed by: NURSE PRACTITIONER

## 2024-06-03 PROCEDURE — 3075F SYST BP GE 130 - 139MM HG: CPT | Performed by: NURSE PRACTITIONER

## 2024-06-03 PROCEDURE — G0438 PPPS, INITIAL VISIT: HCPCS | Performed by: NURSE PRACTITIONER

## 2024-06-03 PROCEDURE — 3017F COLORECTAL CA SCREEN DOC REV: CPT | Performed by: NURSE PRACTITIONER

## 2024-06-03 PROCEDURE — 3079F DIAST BP 80-89 MM HG: CPT | Performed by: NURSE PRACTITIONER

## 2024-06-03 PROCEDURE — 1123F ACP DISCUSS/DSCN MKR DOCD: CPT | Performed by: NURSE PRACTITIONER

## 2024-06-03 ASSESSMENT — SOCIAL DETERMINANTS OF HEALTH (SDOH)
IN A TYPICAL WEEK, HOW MANY TIMES DO YOU TALK ON THE PHONE WITH FAMILY, FRIENDS, OR NEIGHBORS?: MORE THAN THREE TIMES A WEEK
HOW OFTEN DO YOU ATTEND CHURCH OR RELIGIOUS SERVICES?: MORE THAN 4 TIMES PER YEAR
HOW OFTEN DO YOU ATTENT MEETINGS OF THE CLUB OR ORGANIZATION YOU BELONG TO?: NEVER
DO YOU BELONG TO ANY CLUBS OR ORGANIZATIONS SUCH AS CHURCH GROUPS UNIONS, FRATERNAL OR ATHLETIC GROUPS, OR SCHOOL GROUPS?: NO
HOW OFTEN DO YOU GET TOGETHER WITH FRIENDS OR RELATIVES?: MORE THAN THREE TIMES A WEEK

## 2024-06-03 ASSESSMENT — PATIENT HEALTH QUESTIONNAIRE - PHQ9
SUM OF ALL RESPONSES TO PHQ QUESTIONS 1-9: 0
SUM OF ALL RESPONSES TO PHQ9 QUESTIONS 1 & 2: 0
2. FEELING DOWN, DEPRESSED OR HOPELESS: NOT AT ALL
SUM OF ALL RESPONSES TO PHQ QUESTIONS 1-9: 0
1. LITTLE INTEREST OR PLEASURE IN DOING THINGS: NOT AT ALL
SUM OF ALL RESPONSES TO PHQ QUESTIONS 1-9: 0
SUM OF ALL RESPONSES TO PHQ QUESTIONS 1-9: 0

## 2024-06-03 NOTE — PROGRESS NOTES
\"Have you been to the ER, urgent care clinic since your last visit?  Hospitalized since your last visit?\"    NO    “Have you seen or consulted any other health care providers outside of [facility] since your last visit?”    NO        “Have you had a colorectal cancer screening such as a colonoscopy/FIT/Cologuard?        No colonoscopy on file  No cologuard on file  Date of last FIT: 9/21/2022   No flexible sigmoidoscopy on file         Click Here for Release of Records Request      Chief Complaint   Patient presents with    Medicare AWV         Vitals:    06/03/24 0938   BP: 136/84   Pulse: 84   Resp: 16   Temp: 97 °F (36.1 °C)   SpO2: 97%

## 2024-06-03 NOTE — PROGRESS NOTES
Medicare Annual Wellness Visit    Gato ALCANTAR Hess Sr is here for Medicare AWV    Assessment & Plan   Encounter for Medicare annual wellness exam  Severe obesity (BMI 35.0-39.9) with comorbidity (HCC)  Discussed the patient's BMI with him.  The BMI follow up plan is as follows:     dietary management education, guidance, and counseling  encourage exercise  monitor weight  prescribed dietary intake    An After Visit Summary was printed and given to the patient.  Skin irritation  -     Amb External Referral To Dermatology  Elevated PSA  -     KS COLLECTION VENOUS BLOOD VENIPUNCTURE  -     PSA, Diagnostic; Future  History of elevated glucose  -     KS COLLECTION VENOUS BLOOD VENIPUNCTURE  -     Hemoglobin A1C; Future  Essential (primary) hypertension  -     [place] COLLECTION VENOUS BLOOD VENIPUNCTURE  -     Lipid Panel; Future  -     Comprehensive Metabolic Panel; Future  -     CBC with Auto Differential; Future    Recommendations for Preventive Services Due: see orders and patient instructions/AVS.  Recommended screening schedule for the next 5-10 years is provided to the patient in written form: see Patient Instructions/AVS.     No follow-ups on file.     Subjective   Several areas of skin irritations noted on forearms. He requests referral to dermatology for evaluation.    Patient's complete Health Risk Assessment and screening values have been reviewed and are found in Flowsheets. The following problems were reviewed today and where indicated follow up appointments were made and/or referrals ordered.    Positive Risk Factor Screenings with Interventions:                Activity, Diet, and Weight:  On average, how many days per week do you engage in moderate to strenuous exercise (like a brisk walk)?: 2 days  On average, how many minutes do you engage in exercise at this level?: 40 min    Do you eat balanced/healthy meals regularly?: Yes    Body mass index is 39.28 kg/m². (!) Abnormal    Obesity Interventions:  See AVS

## 2024-06-03 NOTE — PATIENT INSTRUCTIONS
feeling in the chest.     Sweating.     Shortness of breath.     Pain, pressure, or a strange feeling in the back, neck, jaw, or upper belly or in one or both shoulders or arms.     Lightheadedness or sudden weakness.     A fast or irregular heartbeat.   After you call 911, the  may tell you to chew 1 adult-strength or 2 to 4 low-dose aspirin. Wait for an ambulance. Do not try to drive yourself.  Watch closely for changes in your health, and be sure to contact your doctor if you have any problems.  Where can you learn more?  Go to https://www.YPlan.net/patientEd and enter F075 to learn more about \"A Healthy Heart: Care Instructions.\"  Current as of: June 24, 2023               Content Version: 14.0  © 7364-2055 [employer].   Care instructions adapted under license by [employer]. If you have questions about a medical condition or this instruction, always ask your healthcare professional. [employer] disclaims any warranty or liability for your use of this information.      Personalized Preventive Plan for Gato Hess Sr - 6/3/2024  Medicare offers a range of preventive health benefits. Some of the tests and screenings are paid in full while other may be subject to a deductible, co-insurance, and/or copay.    Some of these benefits include a comprehensive review of your medical history including lifestyle, illnesses that may run in your family, and various assessments and screenings as appropriate.    After reviewing your medical record and screening and assessments performed today your provider may have ordered immunizations, labs, imaging, and/or referrals for you.  A list of these orders (if applicable) as well as your Preventive Care list are included within your After Visit Summary for your review.    Other Preventive Recommendations:    A preventive eye exam performed by an eye specialist is recommended every 1-2 years to screen for glaucoma; cataracts, macular

## 2024-06-04 LAB
ALBUMIN SERPL-MCNC: 4.1 G/DL (ref 3.5–5)
ALBUMIN/GLOB SERPL: 1.1 (ref 1.1–2.2)
ALP SERPL-CCNC: 105 U/L (ref 45–117)
ALT SERPL-CCNC: 28 U/L (ref 12–78)
ANION GAP SERPL CALC-SCNC: 5 MMOL/L (ref 5–15)
AST SERPL-CCNC: 24 U/L (ref 15–37)
BASOPHILS # BLD: 0 K/UL (ref 0–0.1)
BASOPHILS NFR BLD: 1 % (ref 0–1)
BILIRUB SERPL-MCNC: 0.6 MG/DL (ref 0.2–1)
BUN SERPL-MCNC: 10 MG/DL (ref 6–20)
BUN/CREAT SERPL: 7 (ref 12–20)
CALCIUM SERPL-MCNC: 10.1 MG/DL (ref 8.5–10.1)
CHLORIDE SERPL-SCNC: 103 MMOL/L (ref 97–108)
CHOLEST SERPL-MCNC: 148 MG/DL
CO2 SERPL-SCNC: 28 MMOL/L (ref 21–32)
CREAT SERPL-MCNC: 1.35 MG/DL (ref 0.7–1.3)
DIFFERENTIAL METHOD BLD: ABNORMAL
EOSINOPHIL # BLD: 0.1 K/UL (ref 0–0.4)
EOSINOPHIL NFR BLD: 2 % (ref 0–7)
ERYTHROCYTE [DISTWIDTH] IN BLOOD BY AUTOMATED COUNT: 13.6 % (ref 11.5–14.5)
EST. AVERAGE GLUCOSE BLD GHB EST-MCNC: 108 MG/DL
GLOBULIN SER CALC-MCNC: 3.8 G/DL (ref 2–4)
GLUCOSE SERPL-MCNC: 105 MG/DL (ref 65–100)
HBA1C MFR BLD: 5.4 % (ref 4–5.6)
HCT VFR BLD AUTO: 42 % (ref 36.6–50.3)
HDLC SERPL-MCNC: 48 MG/DL
HDLC SERPL: 3.1 (ref 0–5)
HGB BLD-MCNC: 12.6 G/DL (ref 12.1–17)
IMM GRANULOCYTES # BLD AUTO: 0 K/UL (ref 0–0.04)
IMM GRANULOCYTES NFR BLD AUTO: 0 % (ref 0–0.5)
LDLC SERPL CALC-MCNC: 87 MG/DL (ref 0–100)
LYMPHOCYTES # BLD: 3 K/UL (ref 0.8–3.5)
LYMPHOCYTES NFR BLD: 38 % (ref 12–49)
MCH RBC QN AUTO: 24.8 PG (ref 26–34)
MCHC RBC AUTO-ENTMCNC: 30 G/DL (ref 30–36.5)
MCV RBC AUTO: 82.7 FL (ref 80–99)
MONOCYTES # BLD: 0.8 K/UL (ref 0–1)
MONOCYTES NFR BLD: 10 % (ref 5–13)
NEUTS SEG # BLD: 4 K/UL (ref 1.8–8)
NEUTS SEG NFR BLD: 49 % (ref 32–75)
NRBC # BLD: 0 K/UL (ref 0–0.01)
NRBC BLD-RTO: 0 PER 100 WBC
PLATELET # BLD AUTO: 339 K/UL (ref 150–400)
PMV BLD AUTO: 9.2 FL (ref 8.9–12.9)
POTASSIUM SERPL-SCNC: 4.3 MMOL/L (ref 3.5–5.1)
PROT SERPL-MCNC: 7.9 G/DL (ref 6.4–8.2)
PSA SERPL-MCNC: 6 NG/ML (ref 0.01–4)
RBC # BLD AUTO: 5.08 M/UL (ref 4.1–5.7)
SODIUM SERPL-SCNC: 136 MMOL/L (ref 136–145)
TRIGL SERPL-MCNC: 65 MG/DL
VLDLC SERPL CALC-MCNC: 13 MG/DL
WBC # BLD AUTO: 7.9 K/UL (ref 4.1–11.1)

## 2024-06-05 ENCOUNTER — TELEPHONE (OUTPATIENT)
Age: 64
End: 2024-06-05

## 2024-06-27 RX ORDER — DILTIAZEM HYDROCHLORIDE 300 MG/1
CAPSULE, EXTENDED RELEASE ORAL
Qty: 90 CAPSULE | Refills: 3 | Status: SHIPPED | OUTPATIENT
Start: 2024-06-27

## 2024-07-29 RX ORDER — ENALAPRIL MALEATE 20 MG/1
20 TABLET ORAL DAILY
Qty: 90 TABLET | Refills: 3 | Status: SHIPPED | OUTPATIENT
Start: 2024-07-29

## 2024-08-21 ENCOUNTER — TRANSCRIBE ORDERS (OUTPATIENT)
Facility: HOSPITAL | Age: 64
End: 2024-08-21

## 2024-08-21 DIAGNOSIS — R97.20 ELEVATED PROSTATE SPECIFIC ANTIGEN (PSA): Primary | ICD-10-CM

## 2024-09-09 ENCOUNTER — TELEPHONE (OUTPATIENT)
Age: 64
End: 2024-09-09

## 2024-09-10 ENCOUNTER — TELEPHONE (OUTPATIENT)
Age: 64
End: 2024-09-10

## 2024-10-16 ENCOUNTER — HOSPITAL ENCOUNTER (OUTPATIENT)
Facility: HOSPITAL | Age: 64
Discharge: HOME | End: 2024-10-19
Attending: UROLOGY
Payer: MEDICARE

## 2024-10-16 DIAGNOSIS — R97.20 ELEVATED PROSTATE SPECIFIC ANTIGEN (PSA): ICD-10-CM

## 2024-10-16 PROCEDURE — A9579 GAD-BASE MR CONTRAST NOS,1ML: HCPCS | Performed by: UROLOGY

## 2024-10-16 PROCEDURE — 6360000004 HC RX CONTRAST MEDICATION: Performed by: UROLOGY

## 2024-10-16 PROCEDURE — 72197 MRI PELVIS W/O & W/DYE: CPT

## 2024-10-16 RX ADMIN — GADOTERIDOL 20 ML: 279.3 INJECTION, SOLUTION INTRAVENOUS at 11:10

## 2024-10-22 ENCOUNTER — TELEPHONE (OUTPATIENT)
Age: 64
End: 2024-10-22

## 2024-10-22 NOTE — TELEPHONE ENCOUNTER
S/w wife he will call [facility] office for results

## 2024-10-22 NOTE — TELEPHONE ENCOUNTER
----- Message from ALLY Quintana NP sent at 10/22/2024 12:49 AM EDT -----  Regarding: Dr. Green ordered the imaging  In a recent phone note under Ms. Hess chart MR. Colbert's wife had a question regarding Mr. Hess imaging results.  Dr. Green had ordered the testing.  Has he discussed the tests with Dr. Green? Plese call to ask.  Thanks!   DL

## 2024-10-23 RX ORDER — ENALAPRIL MALEATE 20 MG/1
20 TABLET ORAL DAILY
Qty: 90 TABLET | Refills: 3 | Status: SHIPPED | OUTPATIENT
Start: 2024-10-23

## 2024-10-23 RX ORDER — HYDROCHLOROTHIAZIDE 25 MG/1
TABLET ORAL
Qty: 90 TABLET | Refills: 3 | Status: SHIPPED | OUTPATIENT
Start: 2024-10-23

## 2024-10-23 RX ORDER — ATORVASTATIN CALCIUM 40 MG/1
40 TABLET, FILM COATED ORAL DAILY
Qty: 90 TABLET | Refills: 3 | Status: SHIPPED | OUTPATIENT
Start: 2024-10-23

## 2024-10-23 RX ORDER — DILTIAZEM HYDROCHLORIDE 300 MG/1
CAPSULE, EXTENDED RELEASE ORAL
Qty: 90 CAPSULE | Refills: 1 | Status: SHIPPED | OUTPATIENT
Start: 2024-10-23

## 2024-10-23 NOTE — TELEPHONE ENCOUNTER
Patient requesting refill on     Requested Prescriptions     Pending Prescriptions Disp Refills    enalapril (VASOTEC) 20 MG tablet [Pharmacy Med Name: ENALAPRIL 20MG TABLETS] 90 tablet 3     Sig: TAKE 1 TABLET BY MOUTH DAILY    atorvastatin (LIPITOR) 40 MG tablet [Pharmacy Med Name: ATORVASTATIN 40MG TABLETS] 90 tablet 3     Sig: TAKE 1 TABLET BY MOUTH DAILY    hydroCHLOROthiazide (HYDRODIURIL) 25 MG tablet [Pharmacy Med Name: HYDROCHLOROTHIAZIDE 25MGTABLETS] 90 tablet 3     Sig: TAKE 1 TABLET BY MOUTH DAILY FOR HIGH BLOOD PRESSURE    dilTIAZem (TIADYLT ER) 300 MG extended release capsule [Pharmacy Med Name: TIADYLT ER 300MG CAPSULES (24 HR)] 30 capsule      Sig: TAKE 1 CAPSULE BY MOUTH DAILY FOR HIGH BLOOD PRESSURE        Last OV

## 2024-12-23 ENCOUNTER — OFFICE VISIT (OUTPATIENT)
Age: 64
End: 2024-12-23
Payer: MEDICARE

## 2024-12-23 VITALS
HEART RATE: 98 BPM | TEMPERATURE: 98.1 F | WEIGHT: 267 LBS | OXYGEN SATURATION: 98 % | BODY MASS INDEX: 39.55 KG/M2 | SYSTOLIC BLOOD PRESSURE: 130 MMHG | HEIGHT: 69 IN | DIASTOLIC BLOOD PRESSURE: 80 MMHG | RESPIRATION RATE: 18 BRPM

## 2024-12-23 DIAGNOSIS — K57.90 DIVERTICULOSIS: Primary | ICD-10-CM

## 2024-12-23 DIAGNOSIS — I10 PRIMARY HYPERTENSION: ICD-10-CM

## 2024-12-23 PROCEDURE — 99213 OFFICE O/P EST LOW 20 MIN: CPT | Performed by: NURSE PRACTITIONER

## 2024-12-23 PROCEDURE — 4004F PT TOBACCO SCREEN RCVD TLK: CPT | Performed by: NURSE PRACTITIONER

## 2024-12-23 PROCEDURE — 1159F MED LIST DOCD IN RCRD: CPT | Performed by: NURSE PRACTITIONER

## 2024-12-23 PROCEDURE — 3017F COLORECTAL CA SCREEN DOC REV: CPT | Performed by: NURSE PRACTITIONER

## 2024-12-23 PROCEDURE — G8417 CALC BMI ABV UP PARAM F/U: HCPCS | Performed by: NURSE PRACTITIONER

## 2024-12-23 PROCEDURE — 3079F DIAST BP 80-89 MM HG: CPT | Performed by: NURSE PRACTITIONER

## 2024-12-23 PROCEDURE — G8427 DOCREV CUR MEDS BY ELIG CLIN: HCPCS | Performed by: NURSE PRACTITIONER

## 2024-12-23 PROCEDURE — 1123F ACP DISCUSS/DSCN MKR DOCD: CPT | Performed by: NURSE PRACTITIONER

## 2024-12-23 PROCEDURE — G8484 FLU IMMUNIZE NO ADMIN: HCPCS | Performed by: NURSE PRACTITIONER

## 2024-12-23 PROCEDURE — 3075F SYST BP GE 130 - 139MM HG: CPT | Performed by: NURSE PRACTITIONER

## 2024-12-23 RX ORDER — AMITRIPTYLINE HYDROCHLORIDE 50 MG/1
TABLET ORAL
Qty: 90 TABLET | Refills: 3 | Status: SHIPPED | OUTPATIENT
Start: 2024-12-23

## 2024-12-23 RX ORDER — ATORVASTATIN CALCIUM 40 MG/1
40 TABLET, FILM COATED ORAL DAILY
Qty: 90 TABLET | Refills: 3 | Status: SHIPPED | OUTPATIENT
Start: 2024-12-23

## 2024-12-23 RX ORDER — HYDROCHLOROTHIAZIDE 25 MG/1
TABLET ORAL
Qty: 90 TABLET | Refills: 3 | Status: SHIPPED | OUTPATIENT
Start: 2024-12-23

## 2024-12-23 SDOH — ECONOMIC STABILITY: INCOME INSECURITY: HOW HARD IS IT FOR YOU TO PAY FOR THE VERY BASICS LIKE FOOD, HOUSING, MEDICAL CARE, AND HEATING?: NOT HARD AT ALL

## 2024-12-23 SDOH — ECONOMIC STABILITY: FOOD INSECURITY: WITHIN THE PAST 12 MONTHS, THE FOOD YOU BOUGHT JUST DIDN'T LAST AND YOU DIDN'T HAVE MONEY TO GET MORE.: NEVER TRUE

## 2024-12-23 SDOH — ECONOMIC STABILITY: FOOD INSECURITY: WITHIN THE PAST 12 MONTHS, YOU WORRIED THAT YOUR FOOD WOULD RUN OUT BEFORE YOU GOT MONEY TO BUY MORE.: NEVER TRUE

## 2024-12-23 ASSESSMENT — PATIENT HEALTH QUESTIONNAIRE - PHQ9
SUM OF ALL RESPONSES TO PHQ QUESTIONS 1-9: 0
SUM OF ALL RESPONSES TO PHQ QUESTIONS 1-9: 0
1. LITTLE INTEREST OR PLEASURE IN DOING THINGS: NOT AT ALL
2. FEELING DOWN, DEPRESSED OR HOPELESS: NOT AT ALL
SUM OF ALL RESPONSES TO PHQ QUESTIONS 1-9: 0
SUM OF ALL RESPONSES TO PHQ QUESTIONS 1-9: 0
SUM OF ALL RESPONSES TO PHQ9 QUESTIONS 1 & 2: 0

## 2024-12-23 NOTE — PATIENT INSTRUCTIONS
[facility] Food Resources*  (Call [facility]/ [facility] if need more resources.)       SNAP (formerly Food [place])  What they offer: SNAP is used like cash to buy eligible food items from authorized retailers.  Apply for benefits online: https://www.commonCertusp.[place].gov/  Apply for benefits by phone: [phone] (M-F 8AM - 7PM; Sat 9AM - 12PM)  [employer] Hunger Hotline  What they offer:  The [employer] Hunger Hotline connects individuals in need of food with a local food pantry or program across [address] [facility] and [facility] in [facility].   Website: https://[employer] Interactive/store-/ (search zip code)   Hunger Hotline Inquiry Form: https://[employer]/hunger-hotline/  Hunger Hotline Phone Number:  [phone] x [phone] (M-F 9:00AM-4:00PM)    Meals on Wheels  Meals on Wheels is a program that delivers meals to individuals who have no reliable means for maintaining a healthy diet.  Services are provided by area.     [employer] Service Area:   [facility], and [place].  [facility], [place], Count includes the [facility], [place], and [place]  Website:  https://orat.io.org/how-we-help/meals-on-wheels/  To Apply:  Ages 18-59:  Apply online: https://orat.io.org/meals-on-wheels-application-form/  Apply by phone: [phone]          Meals on Wheels  [facility] Area (continued):  To Apply:  Ages 60+:  Apply Online: https://orat.io.org/meals-on-wheels-application-form/  Apply By Phone: [phone] (M-F 8:30AM-5PM)  For [facility], [facility], [facility], [place], [place], [place] and [place]: You may also apply by calling [employer], [facility] Agency on [phone]  For [facility], and [place] as well as [place], [place] and [place]:    Contact [facility] Agency on [phone]    [facility]

## 2024-12-23 NOTE — PROGRESS NOTES
\"Have you been to the ER, urgent care clinic since your last visit?  Hospitalized since your last visit?\"    NO    “Have you seen or consulted any other health care providers outside of [facility] since your last visit?”    NO        “Have you had a colorectal cancer screening such as a colonoscopy/FIT/Cologuard?    Yes awhile back    No colonoscopy on file  No cologuard on file  Date of last FIT: 9/21/2022   No flexible sigmoidoscopy on file         Click Here for Release of Records Request      Chief Complaint   Patient presents with    Diverticulosis         Vitals:    12/23/24 0857   BP: 130/80   Pulse: 98   Resp: 18   Temp: 98.1 °F (36.7 °C)   SpO2: 98%     
Minutes of Exercise per Session: 40 min   Stress: No Stress Concern Present (11/28/2023)    Citizen of [place] of Occupational Health - Occupational Stress Questionnaire     Feeling of Stress : Not at all   Social Connections: Moderately Integrated (6/3/2024)    Social Connection and Isolation Panel [NHANES]     Frequency of Communication with Friends and Family: More than three times a week     Frequency of Social Gatherings with Friends and Family: More than three times a week     Attends Judaism Services: More than 4 times per year     Active Member of Clubs or Organizations: No     Attends Club or Organization Meetings: Never     Marital Status:    Intimate Partner Violence: Not At Risk (11/28/2023)    Humiliation, Afraid, Rape, and Kick questionnaire     Fear of Current or Ex-Partner: No     Emotionally Abused: No     Physically Abused: No     Sexually Abused: No   Housing Stability: Unknown (12/23/2024)    Housing Stability Vital Sign     Homeless in the Last Year: No       Family History   Problem Relation Age of Onset    Diabetes Mother     Heart Disease Mother     Cancer Father         prostate         Objective:     /80 (Site: Right Upper Arm, Position: Sitting, Cuff Size: Large Adult)   Pulse 98   Temp 98.1 °F (36.7 °C) (Temporal)   Resp 18   Ht 1.753 m (5' 9\")   Wt 121.1 kg (267 lb)   SpO2 98%   BMI 39.43 kg/m²   Body mass index is 39.43 kg/m².  General: Alert and oriented. No acute distress.  Well nourished  HEENT :  Ears:TMs are normal. Canals are clear.   Eyes: pupils equal, round, react to light and accommodation. Extra ocular movements intact. Nose: patent. Mouth and throat is clear.  Neck:supple full range of motion no thyromegaly. Trachea midline, No carotid bruits. No significant lymphadenopathy  Lungs:: clear to auscultation without wheezes, rales, or rhonchi.  Heart :RRR, S1 & S2 are normal intensity. No murmur; no gallop  Abdomen: bowel sounds active. No tenderness,

## 2025-01-23 ENCOUNTER — TELEPHONE (OUTPATIENT)
Age: 65
End: 2025-01-23

## 2025-01-23 DIAGNOSIS — Z12.11 SCREENING FOR COLON CANCER: Primary | ICD-10-CM

## 2025-02-04 LAB — NONINV COLON CA DNA+OCC BLD SCRN STL QL: NEGATIVE

## 2025-02-19 RX ORDER — DILTIAZEM HYDROCHLORIDE 300 MG/1
CAPSULE, EXTENDED RELEASE ORAL
Qty: 90 CAPSULE | Refills: 3 | Status: SHIPPED | OUTPATIENT
Start: 2025-02-19

## 2025-05-20 RX ORDER — ENALAPRIL MALEATE 20 MG/1
20 TABLET ORAL DAILY
Qty: 90 TABLET | Refills: 0 | Status: SHIPPED | OUTPATIENT
Start: 2025-05-20

## 2025-08-01 RX ORDER — ENALAPRIL MALEATE 20 MG/1
20 TABLET ORAL DAILY
Qty: 90 TABLET | Refills: 0 | Status: SHIPPED | OUTPATIENT
Start: 2025-08-01

## 2025-08-27 ENCOUNTER — TELEPHONE (OUTPATIENT)
Age: 65
End: 2025-08-27